# Patient Record
Sex: MALE | Race: BLACK OR AFRICAN AMERICAN | Employment: OTHER | URBAN - METROPOLITAN AREA
[De-identification: names, ages, dates, MRNs, and addresses within clinical notes are randomized per-mention and may not be internally consistent; named-entity substitution may affect disease eponyms.]

---

## 2017-01-05 ENCOUNTER — PATIENT OUTREACH (OUTPATIENT)
Dept: CASE MANAGEMENT | Age: 82
End: 2017-01-05

## 2017-01-05 ENCOUNTER — HOSPITAL ENCOUNTER (OUTPATIENT)
Dept: INFUSION THERAPY | Age: 82
Discharge: HOME | End: 2017-01-05

## 2017-01-05 ENCOUNTER — HOSPITAL ENCOUNTER (OUTPATIENT)
Dept: LAB | Age: 82
Discharge: HOME | End: 2017-01-05
Payer: MEDICARE

## 2017-01-05 DIAGNOSIS — C61 MALIGNANT NEOPLASM OF PROSTATE (HCC): Chronic | ICD-10-CM

## 2017-01-05 LAB
ALBUMIN SERPL BCP-MCNC: 3.6 G/DL (ref 3.2–4.6)
ALBUMIN/GLOB SERPL: 0.9 {RATIO} (ref 1.2–3.5)
ALP SERPL-CCNC: 81 U/L (ref 50–136)
ALT SERPL-CCNC: 27 U/L (ref 12–65)
ANION GAP BLD CALC-SCNC: 5 MMOL/L (ref 7–16)
AST SERPL W P-5'-P-CCNC: 19 U/L (ref 15–37)
BASOPHILS # BLD AUTO: 0 K/UL (ref 0–0.2)
BASOPHILS # BLD: 0 % (ref 0–2)
BILIRUB SERPL-MCNC: 0.3 MG/DL (ref 0.2–1.1)
BUN SERPL-MCNC: 12 MG/DL (ref 8–23)
CALCIUM SERPL-MCNC: 8.6 MG/DL (ref 8.3–10.4)
CHLORIDE SERPL-SCNC: 106 MMOL/L (ref 98–107)
CO2 SERPL-SCNC: 30 MMOL/L (ref 23–32)
CREAT SERPL-MCNC: 1.08 MG/DL (ref 0.8–1.5)
DIFFERENTIAL METHOD BLD: ABNORMAL
EOSINOPHIL # BLD: 0 K/UL (ref 0–0.8)
EOSINOPHIL NFR BLD: 1 % (ref 0.5–7.8)
ERYTHROCYTE [DISTWIDTH] IN BLOOD BY AUTOMATED COUNT: 17.3 % (ref 11.9–14.6)
GLOBULIN SER CALC-MCNC: 4.2 G/DL (ref 2.3–3.5)
GLUCOSE SERPL-MCNC: 93 MG/DL (ref 65–100)
HCT VFR BLD AUTO: 40.4 % (ref 41.1–50.3)
HGB BLD-MCNC: 13.2 G/DL (ref 13.6–17.2)
LYMPHOCYTES # BLD AUTO: 38 % (ref 13–44)
LYMPHOCYTES # BLD: 1.3 K/UL (ref 0.5–4.6)
MCH RBC QN AUTO: 29.3 PG (ref 26.1–32.9)
MCHC RBC AUTO-ENTMCNC: 32.7 G/DL (ref 31.4–35)
MCV RBC AUTO: 89.6 FL (ref 79.6–97.8)
MONOCYTES # BLD: 0.5 K/UL (ref 0.1–1.3)
MONOCYTES NFR BLD AUTO: 14 % (ref 4–12)
NEUTS SEG # BLD: 1.6 K/UL (ref 1.7–8.2)
NEUTS SEG NFR BLD AUTO: 47 % (ref 43–78)
NRBC # BLD: 0 K/UL (ref 0–0.2)
NRBC BLD-RTO: 0 PER 100 WBC (ref 0–2)
PLATELET # BLD AUTO: 194 K/UL (ref 150–450)
PMV BLD AUTO: 9.9 FL (ref 10.8–14.1)
POTASSIUM SERPL-SCNC: 4 MMOL/L (ref 3.5–5.1)
PROT SERPL-MCNC: 7.8 G/DL (ref 6.3–8.2)
RBC # BLD AUTO: 4.51 M/UL (ref 4.23–5.67)
SODIUM SERPL-SCNC: 141 MMOL/L (ref 136–145)
WBC # BLD AUTO: 3.5 K/UL (ref 4.3–11.1)

## 2017-01-05 PROCEDURE — 80053 COMPREHEN METABOLIC PANEL: CPT | Performed by: INTERNAL MEDICINE

## 2017-01-05 PROCEDURE — 85025 COMPLETE CBC W/AUTO DIFF WBC: CPT | Performed by: INTERNAL MEDICINE

## 2017-01-05 NOTE — PROGRESS NOTES
[facility] follow up call to check on patient after visit with oncologist. Laura Moraes to reach patient. Left message requesting a return call. This note will not be viewable in [address]

## 2017-01-06 ENCOUNTER — PATIENT OUTREACH (OUTPATIENT)
Dept: CASE MANAGEMENT | Age: 82
End: 2017-01-06

## 2017-01-06 NOTE — PROGRESS NOTES
CCM follow up call. Spoke with patient's wife regarding oncology visit. They have stopped his treatments at this time CT scan on 3/29/17. Wife reports she and patient decided to stop treatments until CT completed due to his dementia and weight loss. She stated that they return to [facility] in a month but is having issues with paperwork they requested. [facility] suggested a HV to assist with paperwork. She requested a return call on 1/13/16 after her MD appointment to schedule the HV. This note will not be viewable in [address]

## 2017-01-25 ENCOUNTER — PATIENT OUTREACH (OUTPATIENT)
Dept: CASE MANAGEMENT | Age: 82
End: 2017-01-25

## 2017-01-25 NOTE — PROGRESS NOTES
CCM follow up on 1/13/17 and today to try to set up a home visit. Unable to reach patient on both attempts. Left message requesting a return call. CCM will continue to make attempt to outreach to patient. This note will not be viewable in [address]

## 2017-02-01 ENCOUNTER — PATIENT OUTREACH (OUTPATIENT)
Dept: CASE MANAGEMENT | Age: 82
End: 2017-02-01

## 2017-02-01 NOTE — PROGRESS NOTES
CCM follow up call. Spoke with patient's wife to discuss scheduling a HV to assist with paperwork for [facility] program. Wife states today is not a good time, she will need time to find her paperwork. Requested CCM call 2/2/17. CCM will return call as requested. This note will not be viewable in [address]

## 2017-02-02 ENCOUNTER — PATIENT OUTREACH (OUTPATIENT)
Dept: CASE MANAGEMENT | Age: 82
End: 2017-02-02

## 2017-02-02 NOTE — PROGRESS NOTES
CCM follow up call. Spoke with patient's wife. Home visit scheduled for 2/7/17 to assist with getting all appointments up to date and completing paperwork for appointments. This note will not be viewable in [address]

## 2017-02-07 ENCOUNTER — PATIENT OUTREACH (OUTPATIENT)
Dept: CASE MANAGEMENT | Age: 82
End: 2017-02-07

## 2017-02-07 NOTE — PROGRESS NOTES
Home visit with patient and wife to assist with paperwork for [facility]. Patient's wife unable to locate paperwork and date of appointment. Call placed to [facility] to request paperwork and date of scheduled appointment. CCM left message with contact number requesting a return call. CCM to follow up with patient with information once verified with [facility]. This note will not be viewable in [address]

## 2017-02-09 ENCOUNTER — PATIENT OUTREACH (OUTPATIENT)
Dept: CASE MANAGEMENT | Age: 82
End: 2017-02-09

## 2017-02-09 NOTE — PROGRESS NOTES
[facility] follow up call. Patient's wife informed of another call being placed to [facility]. A 2nd message was left on nursing line requesting a return call. [facility] also placed call to [facility] requesting information for [place] assistance. Message left with contact information requesting a return call. This note will not be viewable in [address]

## 2017-02-13 ENCOUNTER — PATIENT OUTREACH (OUTPATIENT)
Dept: CASE MANAGEMENT | Age: 82
End: 2017-02-13

## 2017-02-13 NOTE — PROGRESS NOTES
CCM follow up call to inform patient of [facility] program upcoming appointment on 3/1/17 at 3:00pm. Spoke with patient's wife. Wife stated they will attend. Wife requested a referral for herself. [facility] notified Dr. Araceli Chao of wife's request. Informed patient's wife that another message was left at [facility] office requesting a return call to patient or CCM. This note will not be viewable in [address]

## 2017-02-20 ENCOUNTER — PATIENT OUTREACH (OUTPATIENT)
Dept: CASE MANAGEMENT | Age: 82
End: 2017-02-20

## 2017-02-20 NOTE — PROGRESS NOTES
CCM follow up call. Unable to reach patient or leave message, there was no answer. This note will not be viewable in [address]

## 2017-02-23 ENCOUNTER — PATIENT OUTREACH (OUTPATIENT)
Dept: CASE MANAGEMENT | Age: 82
End: 2017-02-23

## 2017-02-23 NOTE — PROGRESS NOTES
CCM follow up call. Informed patient's wife of receiving [employer]. CCM to forward paperwork. Patient's wife to call CCM is assistance is needed with completing paperwork. This note will not be viewable in [address]

## 2017-03-01 ENCOUNTER — PATIENT OUTREACH (OUTPATIENT)
Dept: CASE MANAGEMENT | Age: 82
End: 2017-03-01

## 2017-03-01 NOTE — PROGRESS NOTES
CCM follow up call. Spoke with patient's wife.  reports they will attend [facility] appointment today at 3:00pm. Verified receipt of paperwork from [employer], but hasn't had the time to look over. Patient's wife to notify CCM if she will require assistance with completing paperwork. This note will not be viewable in [address]

## 2017-03-08 ENCOUNTER — PATIENT OUTREACH (OUTPATIENT)
Dept: CASE MANAGEMENT | Age: 82
End: 2017-03-08

## 2017-03-08 NOTE — PROGRESS NOTES
CCM follow up call. Unable to reach patient or leave message. No answer. This note will not be viewable in [address]

## 2017-03-15 ENCOUNTER — PATIENT OUTREACH (OUTPATIENT)
Dept: CASE MANAGEMENT | Age: 82
End: 2017-03-15

## 2017-03-15 NOTE — PROGRESS NOTES
CCM follow up call. Spoke with patient's wife. Patient has several appointment this month. Labs 3/16/17 for PCP appointment on 3/24/17. CT scan 3/29/17 for oncology appointment 4/5/17 to decide course of treatment. Wife states she will transport to all appointments. Patient's wife verified receipt of [employer] paperwork requesting benefits but has yet to complete. She agreed to notify [facility] if assistance is needed. This note will not be viewable in [address]

## 2017-03-22 ENCOUNTER — PATIENT OUTREACH (OUTPATIENT)
Dept: CASE MANAGEMENT | Age: 82
End: 2017-03-22

## 2017-03-22 NOTE — PROGRESS NOTES
CCM follow up call. Unable to reach patient or patient's wife. Left message with contact information requesting a return call. This note will not be viewable in [address]

## 2017-03-29 ENCOUNTER — PATIENT OUTREACH (OUTPATIENT)
Dept: CASE MANAGEMENT | Age: 82
End: 2017-03-29

## 2017-03-29 NOTE — PROGRESS NOTES
CCM follow up call. Patient requested a home visit to assist with 's affairs paperwork. It was observed in patient;s note that wife stated he isn't taking medications as ordered since his daughter became ill. CCM to provide pill minder and assist patient's wife with medication preparation. Home visit scheduled 3/30/17. This note will not be viewable in [address]

## 2017-03-30 ENCOUNTER — PATIENT OUTREACH (OUTPATIENT)
Dept: CASE MANAGEMENT | Age: 82
End: 2017-03-30

## 2017-04-06 ENCOUNTER — PATIENT OUTREACH (OUTPATIENT)
Dept: CASE MANAGEMENT | Age: 82
End: 2017-04-06

## 2017-04-06 NOTE — PROGRESS NOTES
CCM home visit to assist patient's wife with arranging medications in a pill minder to ensure patient receives medications as ordered. Ferrous Sulfate and Levothyroxine not available at time of visit. Patient's wife to notify PCP and pharmacy regarding refills. Medication reconciliation completed and education provided to patieint's wife. CCM stressed the importance of taking medications as ordered. Patient's wife verbalized understanding. Wife to notified CCM if additional assistance is needed. This note will not be viewable in [address]

## 2017-04-11 ENCOUNTER — HOSPITAL ENCOUNTER (OUTPATIENT)
Dept: CT IMAGING | Age: 82
Discharge: HOME | End: 2017-04-11
Attending: INTERNAL MEDICINE
Payer: MEDICARE

## 2017-04-11 DIAGNOSIS — C18.4 MALIGNANT NEOPLASM OF TRANSVERSE COLON (HCC): Chronic | ICD-10-CM

## 2017-04-11 DIAGNOSIS — C61 MALIGNANT NEOPLASM OF PROSTATE (HCC): Chronic | ICD-10-CM

## 2017-04-11 PROCEDURE — 74011000258 HC RX REV CODE- 258: Performed by: INTERNAL MEDICINE

## 2017-04-11 PROCEDURE — 74011250636 HC RX REV CODE- 250/636: Performed by: INTERNAL MEDICINE

## 2017-04-11 PROCEDURE — 74011636320 HC RX REV CODE- 636/320: Performed by: INTERNAL MEDICINE

## 2017-04-11 PROCEDURE — 74177 CT ABD & PELVIS W/CONTRAST: CPT

## 2017-04-11 RX ORDER — SODIUM CHLORIDE 0.9 % (FLUSH) 0.9 %
10 SYRINGE (ML) INJECTION
Status: COMPLETED | OUTPATIENT
Start: 2017-04-11 | End: 2017-04-11

## 2017-04-11 RX ORDER — HEPARIN 100 UNIT/ML
500 SYRINGE INTRAVENOUS ONCE
Status: COMPLETED | OUTPATIENT
Start: 2017-04-11 | End: 2017-04-11

## 2017-04-11 RX ADMIN — IOPAMIDOL 100 ML: 755 INJECTION, SOLUTION INTRAVENOUS at 15:26

## 2017-04-11 RX ADMIN — Medication 10 ML: at 15:26

## 2017-04-11 RX ADMIN — SODIUM CHLORIDE, PRESERVATIVE FREE 500 UNITS: 5 INJECTION INTRAVENOUS at 15:41

## 2017-04-11 RX ADMIN — SODIUM CHLORIDE 100 ML: 900 INJECTION, SOLUTION INTRAVENOUS at 15:26

## 2017-04-11 RX ADMIN — DIATRIZOATE MEGLUMINE AND DIATRIZOATE SODIUM 30 ML: 660; 100 LIQUID ORAL; RECTAL at 15:26

## 2017-04-13 ENCOUNTER — DOCUMENTATION ONLY (OUTPATIENT)
Dept: ONCOLOGY | Age: 82
End: 2017-04-13

## 2017-04-13 ENCOUNTER — PATIENT OUTREACH (OUTPATIENT)
Dept: CASE MANAGEMENT | Age: 82
End: 2017-04-13

## 2017-04-13 ENCOUNTER — HOSPITAL ENCOUNTER (OUTPATIENT)
Dept: LAB | Age: 82
Discharge: HOME | End: 2017-04-13
Payer: MEDICARE

## 2017-04-13 DIAGNOSIS — C18.4 MALIGNANT NEOPLASM OF TRANSVERSE COLON (HCC): Chronic | ICD-10-CM

## 2017-04-13 LAB — CEA SERPL-MCNC: 1.5 NG/ML (ref 0–3)

## 2017-04-13 PROCEDURE — 36415 COLL VENOUS BLD VENIPUNCTURE: CPT | Performed by: INTERNAL MEDICINE

## 2017-04-13 PROCEDURE — 82378 CARCINOEMBRYONIC ANTIGEN: CPT | Performed by: INTERNAL MEDICINE

## 2017-04-13 NOTE — PROGRESS NOTES
SW followed up with pt and his wife. Pt's wife stated her daughter passed away two weeks ago and she verbalized having a difficult time. SW provided psychosocial support and provided contact information for [facility] grief support group and [facility] grief support group. Pt's wife verbalized appreciation and understanding. SW encouraged her to engage in self-care and to reach out to support group and she verbalized understanding. No other needs identified. SW intends to follow up PRN.

## 2017-04-14 ENCOUNTER — HOSPITAL ENCOUNTER (OUTPATIENT)
Dept: ULTRASOUND IMAGING | Age: 82
Discharge: HOME | End: 2017-04-14
Attending: INTERNAL MEDICINE
Payer: MEDICARE

## 2017-04-14 DIAGNOSIS — I81 PORTAL VEIN THROMBOSIS: ICD-10-CM

## 2017-04-14 PROCEDURE — 76705 ECHO EXAM OF ABDOMEN: CPT

## 2017-04-20 ENCOUNTER — PATIENT OUTREACH (OUTPATIENT)
Dept: CASE MANAGEMENT | Age: 82
End: 2017-04-20

## 2017-04-25 ENCOUNTER — HOSPITAL ENCOUNTER (OUTPATIENT)
Dept: LAB | Age: 82
Discharge: HOME | End: 2017-04-25
Payer: MEDICARE

## 2017-04-25 DIAGNOSIS — C18.4 MALIGNANT NEOPLASM OF TRANSVERSE COLON (HCC): Chronic | ICD-10-CM

## 2017-04-25 DIAGNOSIS — C61 MALIGNANT NEOPLASM OF PROSTATE (HCC): Chronic | ICD-10-CM

## 2017-04-25 LAB
ALBUMIN SERPL BCP-MCNC: 3.7 G/DL (ref 3.2–4.6)
ALBUMIN/GLOB SERPL: 0.9 {RATIO} (ref 1.2–3.5)
ALP SERPL-CCNC: 89 U/L (ref 50–136)
ALT SERPL-CCNC: 30 U/L (ref 12–65)
ANION GAP BLD CALC-SCNC: 7 MMOL/L (ref 7–16)
AST SERPL W P-5'-P-CCNC: 21 U/L (ref 15–37)
BASOPHILS # BLD AUTO: 0 K/UL (ref 0–0.2)
BASOPHILS # BLD: 0 % (ref 0–2)
BILIRUB SERPL-MCNC: 0.4 MG/DL (ref 0.2–1.1)
BUN SERPL-MCNC: 13 MG/DL (ref 8–23)
CALCIUM SERPL-MCNC: 9.1 MG/DL (ref 8.3–10.4)
CHLORIDE SERPL-SCNC: 105 MMOL/L (ref 98–107)
CO2 SERPL-SCNC: 29 MMOL/L (ref 21–32)
CREAT SERPL-MCNC: 1.23 MG/DL (ref 0.8–1.5)
DIFFERENTIAL METHOD BLD: ABNORMAL
EOSINOPHIL # BLD: 0 K/UL (ref 0–0.8)
EOSINOPHIL NFR BLD: 1 % (ref 0.5–7.8)
ERYTHROCYTE [DISTWIDTH] IN BLOOD BY AUTOMATED COUNT: 13.9 % (ref 11.9–14.6)
GLOBULIN SER CALC-MCNC: 4 G/DL (ref 2.3–3.5)
GLUCOSE SERPL-MCNC: 105 MG/DL (ref 65–100)
HCT VFR BLD AUTO: 43.8 % (ref 41.1–50.3)
HGB BLD-MCNC: 14.6 G/DL (ref 13.6–17.2)
LYMPHOCYTES # BLD AUTO: 29 % (ref 13–44)
LYMPHOCYTES # BLD: 1.2 K/UL (ref 0.5–4.6)
MAGNESIUM SERPL-MCNC: 2.5 MG/DL (ref 1.8–2.4)
MCH RBC QN AUTO: 29.7 PG (ref 26.1–32.9)
MCHC RBC AUTO-ENTMCNC: 33.3 G/DL (ref 31.4–35)
MCV RBC AUTO: 89.2 FL (ref 79.6–97.8)
MONOCYTES # BLD: 0.5 K/UL (ref 0.1–1.3)
MONOCYTES NFR BLD AUTO: 11 % (ref 4–12)
NEUTS SEG # BLD: 2.5 K/UL (ref 1.7–8.2)
NEUTS SEG NFR BLD AUTO: 59 % (ref 43–78)
NRBC # BLD: 0 K/UL (ref 0–0.2)
PLATELET # BLD AUTO: 205 K/UL (ref 150–450)
PMV BLD AUTO: 9.9 FL (ref 10.8–14.1)
POTASSIUM SERPL-SCNC: 4.1 MMOL/L (ref 3.5–5.1)
PROT SERPL-MCNC: 7.7 G/DL (ref 6.3–8.2)
PSA SERPL-MCNC: 0.2 NG/ML
RBC # BLD AUTO: 4.91 M/UL (ref 4.23–5.67)
SODIUM SERPL-SCNC: 141 MMOL/L (ref 136–145)
WBC # BLD AUTO: 4.3 K/UL (ref 4.3–11.1)

## 2017-04-25 PROCEDURE — 80053 COMPREHEN METABOLIC PANEL: CPT | Performed by: INTERNAL MEDICINE

## 2017-04-25 PROCEDURE — 84153 ASSAY OF PSA TOTAL: CPT | Performed by: INTERNAL MEDICINE

## 2017-04-25 PROCEDURE — 85025 COMPLETE CBC W/AUTO DIFF WBC: CPT | Performed by: INTERNAL MEDICINE

## 2017-04-25 PROCEDURE — 83735 ASSAY OF MAGNESIUM: CPT | Performed by: INTERNAL MEDICINE

## 2017-05-04 ENCOUNTER — PATIENT OUTREACH (OUTPATIENT)
Dept: CASE MANAGEMENT | Age: 82
End: 2017-05-04

## 2017-05-04 NOTE — PROGRESS NOTES
CCM follow up call. Patient's wife reports patient is scheduled for colonoscopy on 5/15/17. She is concerned about getting him to complete the prep. Patient's wife to read over the prep orders and notify CCM with any questions regarding the prep. This note will not be viewable in [address]

## 2017-05-11 ENCOUNTER — PATIENT OUTREACH (OUTPATIENT)
Dept: CASE MANAGEMENT | Age: 82
End: 2017-05-11

## 2017-05-11 NOTE — PROGRESS NOTES
CCM follow up call. Patient's wife requested home visit to discuss patient's orders prior to colonoscopy scheduled for 5/15/17. CCM to visit 5/12/17 to assist as requested. This note will not be viewable in [address]

## 2017-05-12 ENCOUNTER — PATIENT OUTREACH (OUTPATIENT)
Dept: CASE MANAGEMENT | Age: 82
End: 2017-05-12

## 2017-05-12 NOTE — PROGRESS NOTES
CCM home visit to assist patient's wife with pre-op instructions for patient's colonoscopy on 5/15/17. Patient's wife verbalized understanding pro-op orders and the time to begin. Patient's wife agreed to notify CCM with any issue/concerns prior to initiation of orders. This note will not be viewable in [address]

## 2017-05-15 ENCOUNTER — HOSPITAL ENCOUNTER (OUTPATIENT)
Age: 82
Setting detail: OUTPATIENT SURGERY
Discharge: HOME | End: 2017-05-15
Attending: SURGERY | Admitting: SURGERY
Payer: MEDICARE

## 2017-05-15 VITALS
HEIGHT: 70 IN | RESPIRATION RATE: 10 BRPM | HEART RATE: 46 BPM | DIASTOLIC BLOOD PRESSURE: 81 MMHG | TEMPERATURE: 98.4 F | WEIGHT: 150 LBS | BODY MASS INDEX: 21.47 KG/M2 | OXYGEN SATURATION: 98 % | SYSTOLIC BLOOD PRESSURE: 173 MMHG

## 2017-05-15 PROCEDURE — 74011250636 HC RX REV CODE- 250/636: Performed by: SURGERY

## 2017-05-15 PROCEDURE — 99153 MOD SED SAME PHYS/QHP EA: CPT | Performed by: SURGERY

## 2017-05-15 PROCEDURE — 77030003560 HC NDL HUBR BARD -A: Performed by: SURGERY

## 2017-05-15 PROCEDURE — 76040000019: Performed by: SURGERY

## 2017-05-15 PROCEDURE — 74011250636 HC RX REV CODE- 250/636

## 2017-05-15 PROCEDURE — 99152 MOD SED SAME PHYS/QHP 5/>YRS: CPT | Performed by: SURGERY

## 2017-05-15 RX ORDER — MIDAZOLAM HYDROCHLORIDE 1 MG/ML
.25-5 INJECTION, SOLUTION INTRAMUSCULAR; INTRAVENOUS
Status: DISCONTINUED | OUTPATIENT
Start: 2017-05-15 | End: 2017-05-15 | Stop reason: HOSPADM

## 2017-05-15 RX ORDER — SODIUM CHLORIDE 9 MG/ML
75 INJECTION, SOLUTION INTRAVENOUS CONTINUOUS
Status: DISCONTINUED | OUTPATIENT
Start: 2017-05-15 | End: 2017-05-15 | Stop reason: HOSPADM

## 2017-05-15 RX ORDER — HEPARIN 100 UNIT/ML
300 SYRINGE INTRAVENOUS AS NEEDED
Status: DISCONTINUED | OUTPATIENT
Start: 2017-05-15 | End: 2017-05-15 | Stop reason: HOSPADM

## 2017-05-15 RX ORDER — ONDANSETRON 2 MG/ML
4-8 INJECTION INTRAMUSCULAR; INTRAVENOUS
Status: DISCONTINUED | OUTPATIENT
Start: 2017-05-15 | End: 2017-05-15 | Stop reason: HOSPADM

## 2017-05-15 RX ORDER — FENTANYL CITRATE 50 UG/ML
50 INJECTION, SOLUTION INTRAMUSCULAR; INTRAVENOUS
Status: DISCONTINUED | OUTPATIENT
Start: 2017-05-15 | End: 2017-05-15 | Stop reason: HOSPADM

## 2017-05-15 RX ORDER — DIPHENHYDRAMINE HYDROCHLORIDE 50 MG/ML
50 INJECTION, SOLUTION INTRAMUSCULAR; INTRAVENOUS ONCE
Status: DISCONTINUED | OUTPATIENT
Start: 2017-05-15 | End: 2017-05-15 | Stop reason: HOSPADM

## 2017-05-15 RX ADMIN — Medication 300 UNITS: at 11:00

## 2017-05-15 NOTE — DISCHARGE INSTRUCTIONS
Gastrointestinal Colonoscopy/Flexible Sigmoidoscopy - Lower Exam Discharge Instructions  1. Call Dr. Crystal Franco for any problems or questions. 2. Contact the doctors office for follow up appointment as directed  3. Medication may cause drowsiness for several hours, therefore, do not drive or operate machinery for remainder of the day. 4. No alcohol today. 5. Ordinarily, you may resume regular diet and activity after exam unless otherwise specified by your physician. 6. Because of air put into your colon during exam, you may experience some abdominal distension, relieved by the passage of gas, for several hours. 7. Contact your physician if you have any of the following:  a. Excessive amount of bleeding - large amount when having a bowel movement. Occasional specks of blood with bowel movement would not be unusual.  b. Severe abdominal pain  c. Fever or Chills  Any additional instructions: Follow up in 3 years    Instructions given to Roderick. and other family members.   Instructions given by:  Tori Barragan

## 2017-05-15 NOTE — IP AVS SNAPSHOT
[address] 
[phone] Patient: Jaya Martinez. MRN: [id number] QYS:[id number] You are allergic to the following Allergen Reactions Shellfish Containing Products Rash Recent Documentation Height Weight BMI Smoking Status 1.778 m 68 kg 21.52 kg/m2 Never Smoker Emergency Contacts Name Discharge Info Relation Home Work Mobile Kamilla Dave DISCHARGE CAREGIVER [3] Spouse [3] [phone] About your hospitalization You were admitted on:  May 15, 2017 You last received care in the:  [facility] You were discharged on:  May 15, 2017 Unit phone number:  [phone] Why you were hospitalized Your primary diagnosis was:  Not on File Providers Seen During Your Hospitalizations Provider Role Specialty Primary office phone Adriana Vieyra MD Attending Provider General Surgery [phone] Your Primary Care Physician (PCP) Primary Care Physician Office Phone Office Fax [address] [phone] Follow-up Information None Your Appointments Thursday June 29, 2017  8:10 AM EDT  
LAB with IMD LAB [facility] ([address] 2 [facility] [address] [place] [id number] [phone] Current Discharge Medication List  
  
ASK your doctor about these medications Dose & Instructions Dispensing Information Comments Morning Noon Evening Bedtime  
 acetaminophen 325 mg tablet Commonly known as:  TYLENOL Your last dose was: Your next dose is: TAKE 2 TABLETS BY MOUTH EVERY 8 HOURS AS NEEDED Refills:  0  
     
   
   
   
  
 docusate sodium 100 mg capsule Commonly known as:  Cheryl Pleitez Your last dose was: Your next dose is:  TID prn  
 Refills:  0  
     
   
   
   
  
 ferrous sulfate 325 mg (65 mg iron) tablet Your last dose was: Your next dose is:    
   
   
 Bid or tid as directed Quantity:  180 Tab Refills:  3  
     
   
   
   
  
 levothyroxine 50 mcg tablet Commonly known as:  SYNTHROID Your last dose was: Your next dose is:    
   
   
 Dose:  50 mcg Take 1 Tab by mouth Daily (before breakfast). Quantity:  90 Tab Refills:  3  
     
   
   
   
  
 melatonin 3 mg tablet Your last dose was: Your next dose is:    
   
   
 as needed Refills:  0  
     
   
   
   
  
 memantine-donepezil 28-10 mg Cspx Commonly known as:  MOTION PICTURE [facility] Your last dose was: Your next dose is:    
   
   
 Dose:  1 Cap Take 1 Cap by mouth daily. Quantity:  90 Cap Refills:  3 VITAMIN D2 50,000 unit capsule Generic drug:  ergocalciferol Your last dose was: Your next dose is:    
   
   
 Dose:  225[address] Units Take 50,000 Units by mouth every seven (7) days. Refills:  0 Discharge Instructions Gastrointestinal Colonoscopy/Flexible Sigmoidoscopy - Lower Exam Discharge Instructions 1. Call Dr. Pina Nix for any problems or questions. 2. Contact the doctors office for follow up appointment as directed 3. Medication may cause drowsiness for several hours, therefore, do not drive or operate machinery for remainder of the day. 4. No alcohol today. 5. Ordinarily, you may resume regular diet and activity after exam unless otherwise specified by your physician. 6. Because of air put into your colon during exam, you may experience some abdominal distension, relieved by the passage of gas, for several hours. 7. Contact your physician if you have any of the following: 
a. Excessive amount of bleeding  large amount when having a bowel movement.   Occasional specks of blood with bowel movement would not be unusual. 
b. Severe abdominal pain 
c. Fever or Chills Any additional instructions: Follow up in 3 years Instructions given to Roderick. and other family members. Instructions given by:  Kieran Medel Discharge Orders None ACO Transitions of Care Introducing [facility] [address] offers a voluntary care coordination program to provide high quality service and care to [facility] fee-for-service beneficiaries. Beto Noel was designed to help you enhance your health and well-being through the following services: ? Transitions of Care  support for individuals who are transitioning from one care setting to another (example: Hospital to home). ? Chronic and Complex Care Coordination  support for individuals and caregivers of those with serious or chronic illnesses or with more than one chronic (ongoing) condition and those who take a number of different medications. If you meet specific medical criteria, a [address] [place] [address] Rd may call you directly to coordinate your care with your primary care physician and your other care providers. For questions about the [facility] programs, please, contact your physicians office. For general questions or additional information about Accountable Care Organizations: 
Please visit www.medicare.gov/acos. html or call 1-800-MEDICARE (0-130.101.5027) TTY users should call 6-471.346.8247. Introducing [facility]! Jason Bashir introduces [employer] patient portal. Now you can access parts of your medical record, email your doctor's office, and request medication refills online. 1. In your internet browser, go to [email or web address] 2. Click on the First Time User? Click Here link in the Sign In box. You will see the New Member Sign Up page. 3. Enter your [employer] Access Code exactly as it appears below.  You will not need to use this code after youve completed the sign-up process. If you do not sign up before the expiration date, you must request a new code. · [employer] Access Code: [email or web address] Expires: 7/6/2017 10:47 AM 
 
4. Enter the last four digits of your Social Security Number (xxxx) and Date of Birth (mm/dd/yyyy) as indicated and click Submit. You will be taken to the next sign-up page. 5. Create a [employer] ID. This will be your [employer] login ID and cannot be changed, so think of one that is secure and easy to remember. 6. Create a [employer] password. You can change your password at any time. 7. Enter your Password Reset Question and Answer. This can be used at a later time if you forget your password. 8. Enter your e-mail address. You will receive e-mail notification when new information is available in [address] 9. Click Sign Up. You can now view and download portions of your medical record. 10. Click the Download Summary menu link to download a portable copy of your medical information. If you have questions, please visit the Frequently Asked Questions section of the [employer] website. Remember, [employer] is NOT to be used for urgent needs. For medical emergencies, dial 911. Now available from your iPhone and Android! General Information Please provide this summary of care documentation to your next provider. Patient Signature:  ____________________________________________________________ Date:  ____________________________________________________________  
  
Candice Bustos Provider Signature:  ____________________________________________________________ Date:  ____________________________________________________________

## 2017-05-15 NOTE — PROGRESS NOTES
Port accessed using sterile technique. Good blood return and flushes well. Jo needle inserted without difficulty.  Pt tolerated well

## 2017-05-16 ENCOUNTER — PATIENT OUTREACH (OUTPATIENT)
Dept: CASE MANAGEMENT | Age: 82
End: 2017-05-16

## 2017-05-16 NOTE — PROGRESS NOTES
CCM follow up call. Patient's wife stated the colonoscopy went well on 5/15/17. No issues or concerns at present time. Wife to notify CCM with any future issues/concerns. This note will not be viewable in [address]

## 2017-05-30 ENCOUNTER — PATIENT OUTREACH (OUTPATIENT)
Dept: CASE MANAGEMENT | Age: 82
End: 2017-05-30

## 2017-05-30 NOTE — PROGRESS NOTES
CCM follow up call. Unable to talk with patient or patient's wife. Wife reports \"we are on our way out the door for an appointment. \" Patient's wife to call CCM when they return. This note will not be viewable in [address]

## 2017-06-23 ENCOUNTER — PATIENT OUTREACH (OUTPATIENT)
Dept: CASE MANAGEMENT | Age: 82
End: 2017-06-23

## 2017-06-23 NOTE — PROGRESS NOTES
CCM follow up call. Patient's wife reports \"things remain about the same. \" She continues to administer medications as ordered. Reports she may require CCM assistance with [employer] paperwork. She will notify CCM is assistance is needed. This note will not be viewable in [address]

## 2017-06-28 ENCOUNTER — PATIENT OUTREACH (OUTPATIENT)
Dept: CASE MANAGEMENT | Age: 82
End: 2017-06-28

## 2017-06-28 NOTE — PROGRESS NOTES
CCM follow call. Patient's wife states she is unable to talk at present time. They have an appointment. She will return call when she is available to talk. This note will not be viewable in [address]

## 2017-07-05 ENCOUNTER — PATIENT OUTREACH (OUTPATIENT)
Dept: CASE MANAGEMENT | Age: 82
End: 2017-07-05

## 2017-07-19 ENCOUNTER — PATIENT OUTREACH (OUTPATIENT)
Dept: CASE MANAGEMENT | Age: 82
End: 2017-07-19

## 2017-07-21 PROBLEM — R73.01 FASTING HYPERGLYCEMIA: Status: ACTIVE | Noted: 2017-07-21

## 2017-08-02 ENCOUNTER — PATIENT OUTREACH (OUTPATIENT)
Dept: CASE MANAGEMENT | Age: 82
End: 2017-08-02

## 2017-08-02 NOTE — PROGRESS NOTES
[facility] follow up call. Patient's wife reports she obtained patient's Namzaric and Vitamin D. She states she is using pill minder for preparation of patient's medications to administer as ordered. Patient to follow up with Oncologist 10/25/17, PCP 11/3/17, and Neurologist 1/24/17. She has completed the [employer] paperwork and plans to go to the office to apply for  benefits for patient. She states she will contact [facility] with any future issues/concerns. This note will not be viewable in [address]

## 2017-08-13 ENCOUNTER — HOSPITAL ENCOUNTER (EMERGENCY)
Age: 82
Discharge: HOME | End: 2017-08-13
Attending: EMERGENCY MEDICINE
Payer: MEDICARE

## 2017-08-13 VITALS
TEMPERATURE: 98.2 F | WEIGHT: 161 LBS | HEART RATE: 85 BPM | DIASTOLIC BLOOD PRESSURE: 98 MMHG | RESPIRATION RATE: 16 BRPM | SYSTOLIC BLOOD PRESSURE: 150 MMHG | HEIGHT: 70 IN | BODY MASS INDEX: 23.05 KG/M2 | OXYGEN SATURATION: 100 %

## 2017-08-13 DIAGNOSIS — Z00.00 WELL ADULT HEALTH CHECK: Primary | ICD-10-CM

## 2017-08-13 PROCEDURE — 99283 EMERGENCY DEPT VISIT LOW MDM: CPT | Performed by: EMERGENCY MEDICINE

## 2017-08-13 NOTE — ED TRIAGE NOTES
Pt family states he is here because it was \"wet\" around his port. Family states it has port has been wet for an undetermined amount of time. No redness or swelling noted. Pt states no pain to the site.

## 2017-08-14 ENCOUNTER — PATIENT OUTREACH (OUTPATIENT)
Dept: CASE MANAGEMENT | Age: 82
End: 2017-08-14

## 2017-08-14 NOTE — ED NOTES
Dressing report from port site. Soiled and states it has been on there for \"about a month. \" Pt is very poor historian. No signs of drainage or leaking.

## 2017-08-14 NOTE — ED NOTES
I have reviewed discharge instructions with the spouse. The spouse verbalized understanding. Discharge medications reviewed with spouse and appropriate educational materials and side effects teaching were provided.

## 2017-08-14 NOTE — PROGRESS NOTES
This note will not be viewable in [address] LINDA OUTREACH #1  Initial outreach attempt unsuccessful. Unable to leave message. No answer. Will attempt 2nd outreach within 24 hrs.

## 2017-08-15 ENCOUNTER — PATIENT OUTREACH (OUTPATIENT)
Dept: CASE MANAGEMENT | Age: 82
End: 2017-08-15

## 2017-08-15 NOTE — PROGRESS NOTES
This note will not be viewable in [address] LINDA OUTREACH #2  Left message to call back. 2nd outreach attempt unsuccessful. Will schedule 3rd outreach attempt within 5 business days.

## 2017-08-21 ENCOUNTER — PATIENT OUTREACH (OUTPATIENT)
Dept: CASE MANAGEMENT | Age: 82
End: 2017-08-21

## 2017-08-21 NOTE — PROGRESS NOTES
This note will not be viewable in [address] LINDA OUTREACH #3  Final outreach call unsuccessful. Unable to reach patient after several attempts. Will close case at this time. Will reopen case if return call is received.

## 2017-10-24 ENCOUNTER — HOSPITAL ENCOUNTER (OUTPATIENT)
Dept: LAB | Age: 82
Discharge: HOME | End: 2017-10-24
Payer: MEDICARE

## 2017-10-24 DIAGNOSIS — C18.4 MALIGNANT NEOPLASM OF TRANSVERSE COLON (HCC): Chronic | ICD-10-CM

## 2017-10-24 DIAGNOSIS — C61 MALIGNANT NEOPLASM OF PROSTATE (HCC): Chronic | ICD-10-CM

## 2017-10-24 LAB
ALBUMIN SERPL-MCNC: 3.6 G/DL (ref 3.2–4.6)
ALBUMIN/GLOB SERPL: 0.9 {RATIO} (ref 1.2–3.5)
ALP SERPL-CCNC: 79 U/L (ref 50–136)
ALT SERPL-CCNC: 16 U/L (ref 12–65)
ANION GAP SERPL CALC-SCNC: 6 MMOL/L (ref 7–16)
AST SERPL-CCNC: 17 U/L (ref 15–37)
BASOPHILS # BLD: 0 K/UL (ref 0–0.2)
BASOPHILS NFR BLD: 0 % (ref 0–2)
BILIRUB SERPL-MCNC: 0.5 MG/DL (ref 0.2–1.1)
BUN SERPL-MCNC: 11 MG/DL (ref 8–23)
CALCIUM SERPL-MCNC: 8.4 MG/DL (ref 8.3–10.4)
CEA SERPL-MCNC: 1.6 NG/ML (ref 0–3)
CHLORIDE SERPL-SCNC: 105 MMOL/L (ref 98–107)
CO2 SERPL-SCNC: 27 MMOL/L (ref 21–32)
CREAT SERPL-MCNC: 1.12 MG/DL (ref 0.8–1.5)
DIFFERENTIAL METHOD BLD: ABNORMAL
EOSINOPHIL # BLD: 0.1 K/UL (ref 0–0.8)
EOSINOPHIL NFR BLD: 2 % (ref 0.5–7.8)
ERYTHROCYTE [DISTWIDTH] IN BLOOD BY AUTOMATED COUNT: 14.4 % (ref 11.9–14.6)
GLOBULIN SER CALC-MCNC: 4.1 G/DL (ref 2.3–3.5)
GLUCOSE SERPL-MCNC: 93 MG/DL (ref 65–100)
HCT VFR BLD AUTO: 40.3 % (ref 41.1–50.3)
HGB BLD-MCNC: 13.5 G/DL (ref 13.6–17.2)
LYMPHOCYTES # BLD: 1.3 K/UL (ref 0.5–4.6)
LYMPHOCYTES NFR BLD: 40 % (ref 13–44)
MCH RBC QN AUTO: 29.2 PG (ref 26.1–32.9)
MCHC RBC AUTO-ENTMCNC: 33.5 G/DL (ref 31.4–35)
MCV RBC AUTO: 87 FL (ref 79.6–97.8)
MONOCYTES # BLD: 0.4 K/UL (ref 0.1–1.3)
MONOCYTES NFR BLD: 12 % (ref 4–12)
NEUTS SEG # BLD: 1.5 K/UL (ref 1.7–8.2)
NEUTS SEG NFR BLD: 46 % (ref 43–78)
NRBC # BLD: 0 K/UL (ref 0–0.2)
PLATELET # BLD AUTO: 200 K/UL (ref 150–450)
PMV BLD AUTO: 10 FL (ref 10.8–14.1)
POTASSIUM SERPL-SCNC: 3.8 MMOL/L (ref 3.5–5.1)
PROT SERPL-MCNC: 7.7 G/DL (ref 6.3–8.2)
RBC # BLD AUTO: 4.63 M/UL (ref 4.23–5.67)
SODIUM SERPL-SCNC: 138 MMOL/L (ref 136–145)
WBC # BLD AUTO: 3.1 K/UL (ref 4.3–11.1)

## 2017-10-24 PROCEDURE — 80053 COMPREHEN METABOLIC PANEL: CPT | Performed by: INTERNAL MEDICINE

## 2017-10-24 PROCEDURE — 85025 COMPLETE CBC W/AUTO DIFF WBC: CPT | Performed by: INTERNAL MEDICINE

## 2017-10-24 PROCEDURE — 82378 CARCINOEMBRYONIC ANTIGEN: CPT | Performed by: INTERNAL MEDICINE

## 2018-03-06 PROBLEM — F02.80 LATE ONSET ALZHEIMER'S DISEASE WITHOUT BEHAVIORAL DISTURBANCE (HCC): Chronic | Status: ACTIVE | Noted: 2018-03-06

## 2018-03-06 PROBLEM — G30.1 LATE ONSET ALZHEIMER'S DISEASE WITHOUT BEHAVIORAL DISTURBANCE (HCC): Chronic | Status: ACTIVE | Noted: 2018-03-06

## 2018-04-24 ENCOUNTER — HOSPITAL ENCOUNTER (OUTPATIENT)
Dept: LAB | Age: 83
Discharge: HOME | End: 2018-04-24
Payer: MEDICARE

## 2018-04-24 DIAGNOSIS — C18.4 MALIGNANT NEOPLASM OF TRANSVERSE COLON (HCC): Chronic | ICD-10-CM

## 2018-04-24 LAB
ALBUMIN SERPL-MCNC: 3.8 G/DL (ref 3.2–4.6)
ALBUMIN/GLOB SERPL: 0.9 {RATIO} (ref 1.2–3.5)
ALP SERPL-CCNC: 90 U/L (ref 50–136)
ALT SERPL-CCNC: 40 U/L (ref 12–65)
ANION GAP SERPL CALC-SCNC: 6 MMOL/L (ref 7–16)
AST SERPL-CCNC: 31 U/L (ref 15–37)
BASOPHILS # BLD: 0 K/UL (ref 0–0.2)
BASOPHILS NFR BLD: 0 % (ref 0–2)
BILIRUB SERPL-MCNC: 0.3 MG/DL (ref 0.2–1.1)
BUN SERPL-MCNC: 15 MG/DL (ref 8–23)
CALCIUM SERPL-MCNC: 8.6 MG/DL (ref 8.3–10.4)
CEA SERPL-MCNC: 1.9 NG/ML (ref 0–3)
CHLORIDE SERPL-SCNC: 108 MMOL/L (ref 98–107)
CO2 SERPL-SCNC: 31 MMOL/L (ref 21–32)
CREAT SERPL-MCNC: 1.28 MG/DL (ref 0.8–1.5)
DIFFERENTIAL METHOD BLD: ABNORMAL
EOSINOPHIL # BLD: 0.1 K/UL (ref 0–0.8)
EOSINOPHIL NFR BLD: 1 % (ref 0.5–7.8)
ERYTHROCYTE [DISTWIDTH] IN BLOOD BY AUTOMATED COUNT: 15 % (ref 11.9–14.6)
GLOBULIN SER CALC-MCNC: 4.4 G/DL (ref 2.3–3.5)
GLUCOSE SERPL-MCNC: 87 MG/DL (ref 65–100)
HCT VFR BLD AUTO: 44 % (ref 41.1–50.3)
HGB BLD-MCNC: 14.3 G/DL (ref 13.6–17.2)
LYMPHOCYTES # BLD: 1.3 K/UL (ref 0.5–4.6)
LYMPHOCYTES NFR BLD: 29 % (ref 13–44)
MCH RBC QN AUTO: 29.9 PG (ref 26.1–32.9)
MCHC RBC AUTO-ENTMCNC: 32.5 G/DL (ref 31.4–35)
MCV RBC AUTO: 92.1 FL (ref 79.6–97.8)
MONOCYTES # BLD: 0.5 K/UL (ref 0.1–1.3)
MONOCYTES NFR BLD: 11 % (ref 4–12)
NEUTS SEG # BLD: 2.7 K/UL (ref 1.7–8.2)
NEUTS SEG NFR BLD: 59 % (ref 43–78)
NRBC # BLD: 0 K/UL (ref 0–0.2)
PLATELET # BLD AUTO: 192 K/UL (ref 150–450)
PMV BLD AUTO: 10.1 FL (ref 10.8–14.1)
POTASSIUM SERPL-SCNC: 3.7 MMOL/L (ref 3.5–5.1)
PROT SERPL-MCNC: 8.2 G/DL (ref 6.3–8.2)
RBC # BLD AUTO: 4.78 M/UL (ref 4.23–5.67)
SODIUM SERPL-SCNC: 145 MMOL/L (ref 136–145)
WBC # BLD AUTO: 4.6 K/UL (ref 4.3–11.1)

## 2018-04-24 PROCEDURE — 36415 COLL VENOUS BLD VENIPUNCTURE: CPT | Performed by: INTERNAL MEDICINE

## 2018-04-24 PROCEDURE — 80053 COMPREHEN METABOLIC PANEL: CPT | Performed by: INTERNAL MEDICINE

## 2018-04-24 PROCEDURE — 82378 CARCINOEMBRYONIC ANTIGEN: CPT | Performed by: INTERNAL MEDICINE

## 2018-04-24 PROCEDURE — 85025 COMPLETE CBC W/AUTO DIFF WBC: CPT | Performed by: INTERNAL MEDICINE

## 2018-04-27 ENCOUNTER — HOSPITAL ENCOUNTER (OUTPATIENT)
Dept: CT IMAGING | Age: 83
Discharge: HOME | End: 2018-04-27
Attending: INTERNAL MEDICINE
Payer: MEDICARE

## 2018-04-27 DIAGNOSIS — C18.4 MALIGNANT NEOPLASM OF TRANSVERSE COLON (HCC): Chronic | ICD-10-CM

## 2018-04-27 LAB — CREAT BLD-MCNC: 1.1 MG/DL (ref 0.8–1.5)

## 2018-04-27 PROCEDURE — 82565 ASSAY OF CREATININE: CPT

## 2018-04-27 PROCEDURE — 74011636320 HC RX REV CODE- 636/320: Performed by: INTERNAL MEDICINE

## 2018-04-27 PROCEDURE — 74011000258 HC RX REV CODE- 258: Performed by: INTERNAL MEDICINE

## 2018-04-27 PROCEDURE — 74177 CT ABD & PELVIS W/CONTRAST: CPT

## 2018-04-27 RX ORDER — SODIUM CHLORIDE 0.9 % (FLUSH) 0.9 %
10 SYRINGE (ML) INJECTION
Status: COMPLETED | OUTPATIENT
Start: 2018-04-27 | End: 2018-04-27

## 2018-04-27 RX ADMIN — IOPAMIDOL 100 ML: 755 INJECTION, SOLUTION INTRAVENOUS at 14:11

## 2018-04-27 RX ADMIN — DIATRIZOATE MEGLUMINE AND DIATRIZOATE SODIUM 15 ML: 660; 100 LIQUID ORAL; RECTAL at 14:14

## 2018-04-27 RX ADMIN — Medication 10 ML: at 14:14

## 2018-04-27 RX ADMIN — SODIUM CHLORIDE 100 ML: 900 INJECTION, SOLUTION INTRAVENOUS at 14:14

## 2018-08-09 ENCOUNTER — HOSPITAL ENCOUNTER (EMERGENCY)
Age: 83
Discharge: HOME | End: 2018-08-10
Attending: EMERGENCY MEDICINE
Payer: MEDICARE

## 2018-08-09 DIAGNOSIS — Z45.2 ENCOUNTER FOR ADJUSTMENT AND MANAGEMENT OF VASCULAR ACCESS DEVICE: Primary | ICD-10-CM

## 2018-08-09 PROCEDURE — 99284 EMERGENCY DEPT VISIT MOD MDM: CPT | Performed by: EMERGENCY MEDICINE

## 2018-08-10 VITALS
DIASTOLIC BLOOD PRESSURE: 80 MMHG | SYSTOLIC BLOOD PRESSURE: 168 MMHG | TEMPERATURE: 98.1 F | OXYGEN SATURATION: 98 % | HEART RATE: 84 BPM | RESPIRATION RATE: 16 BRPM

## 2018-08-10 NOTE — ED NOTES
Pt's wife is concerned \"the port doesn't look right\" Skin is intact without induration or exudate. Area is non-tender to touch and palpation and patient appears to be in no distress.

## 2018-08-10 NOTE — ED PROVIDER NOTES
HPI     This 82-year-old male with a history of prostate cancer presenting to the emergency department for evaluation of his right subclavian port. The patient states that he does not wish to be in the emergency department and does not think there is any problem with it, however his wife is concerned about a change in appearance. She states that it is more defined and appears as though the skin around it has \"shrunken\". When asked, she does not know when the last time she visually inspected the port was. She is concerned by how significantly extends out around the skin. There is no surrounding erythema, but it is not tender to palpation there is no swelling. They state that the port is not been accessed for 4-6 months. However he is currently still being treated for cancer and they're supposed to go to oncology next week for ongoing treatment recommendations. Past Medical History:   Diagnosis Date    Anemia     [facility])     prostate cancer.  Dementia     Full dentures     Thyroid disease     hypothyroid. Past Surgical History:   Procedure Laterality Date    COLONOSCOPY N/A 5/15/2017    COLON[facility] Gruber [facility]COPY    HX COLECTOMY      HX COLONOSCOPY      HX ENDOSCOPY      HX HERNIA REPAIR      groin - unknown side          Family History:   Problem Relation Age of Onset    Diabetes Mother     Heart Disease Mother     Heart Disease Father     Hypertension Father        Social History     Social History    Marital status:      Spouse name: N/A    Number of children: N/A    Years of education: N/A     Occupational History    Not on file. Social History Main Topics    Smoking status: Never Smoker    Smokeless tobacco: Never Used      Comment: Smoked about 1 year when he was 27.       Alcohol use No    Drug use: No    Sexual activity: Not on file     Other Topics Concern    Not on file     Social History Narrative ALLERGIES: Shellfish containing products    Review of Systems   Constitutional: Negative for fatigue and fever. HENT: Negative. Eyes: Negative. Respiratory: Negative for cough, chest tightness, shortness of breath and wheezing. Cardiovascular: Negative for chest pain. Gastrointestinal: Negative for abdominal distention, abdominal pain, diarrhea, nausea and vomiting. Genitourinary: Negative for dysuria, flank pain, frequency, genital sores and urgency. Musculoskeletal: Negative. Skin: Negative for rash. Neurological: Negative for dizziness, syncope and headaches. All other systems reviewed and are negative. Vitals:    08/09/18 2255   BP: 182/85   Pulse: (!) 50   Resp: 18   Temp: 97.9 °F (36.6 °C)   SpO2: 100%            Physical Exam   Constitutional: He is oriented to person, place, and time. He appears well-developed and well-nourished. No distress. HENT:   Head: Normocephalic and atraumatic. Eyes: EOM are normal. Pupils are equal, round, and reactive to light. Neck: Normal range of motion. Cardiovascular: Intact distal pulses. Pulmonary/Chest: Effort normal.   Tunneled subclavian catheter on the right chest, no tenderness, no fluctuance, no fluid collection   Musculoskeletal: Normal range of motion. Neurological: He is alert and oriented to person, place, and time. Psychiatric: He has a normal mood and affect. His behavior is normal.   Vitals reviewed. MDM  Number of Diagnoses or Management Options  Encounter for adjustment and management of vascular access device:   Diagnosis management comments: Patient has a very normal-appearing right subclavian port. There is no evidence of infection, infiltration, or other problem related to the port. I recommended that they follow up with oncology as previously arranged.         ED Course       Procedures

## 2018-08-10 NOTE — ED NOTES
I have reviewed discharge instructions with the patient and spouse. The patient and spouse verbalized understanding. Patient left ED via Discharge Method: ambulatory to Home with Abdullahi Vergara, his wife. .    Opportunity for questions and clarification provided. Patient given 0 scripts. To continue your aftercare when you leave the hospital, you may receive an automated call from our care team to check in on how you are doing. This is a free service and part of our promise to provide the best care and service to meet your aftercare needs.  If you have questions, or wish to unsubscribe from this service please call [phone]. Thank you for Choosing our [facility] Emergency Department.

## 2018-08-10 NOTE — DISCHARGE INSTRUCTIONS
Your port appears normal today in the emergency department. I do not think any new change in management would be appropriate at this time. Follow-up with your oncologist as previously scheduled. Return to the ER for any change or new symptoms.

## 2018-11-06 ENCOUNTER — HOSPITAL ENCOUNTER (OUTPATIENT)
Dept: LAB | Age: 83
Discharge: HOME | End: 2018-11-06
Payer: MEDICARE

## 2018-11-06 DIAGNOSIS — C18.4 MALIGNANT NEOPLASM OF TRANSVERSE COLON (HCC): Chronic | ICD-10-CM

## 2018-11-06 LAB
ALBUMIN SERPL-MCNC: 3.4 G/DL (ref 3.2–4.6)
ALBUMIN/GLOB SERPL: 0.9 {RATIO} (ref 1.2–3.5)
ALP SERPL-CCNC: 73 U/L (ref 50–136)
ALT SERPL-CCNC: 18 U/L (ref 12–65)
ANION GAP SERPL CALC-SCNC: 4 MMOL/L (ref 7–16)
AST SERPL-CCNC: 18 U/L (ref 15–37)
BASOPHILS # BLD: 0 K/UL (ref 0–0.2)
BASOPHILS NFR BLD: 0 % (ref 0–2)
BILIRUB SERPL-MCNC: 0.3 MG/DL (ref 0.2–1.1)
BUN SERPL-MCNC: 12 MG/DL (ref 8–23)
CALCIUM SERPL-MCNC: 8.3 MG/DL (ref 8.3–10.4)
CEA SERPL-MCNC: 1.7 NG/ML (ref 0–3)
CHLORIDE SERPL-SCNC: 107 MMOL/L (ref 98–107)
CO2 SERPL-SCNC: 30 MMOL/L (ref 21–32)
CREAT SERPL-MCNC: 1.11 MG/DL (ref 0.8–1.5)
DIFFERENTIAL METHOD BLD: ABNORMAL
EOSINOPHIL # BLD: 0 K/UL (ref 0–0.8)
EOSINOPHIL NFR BLD: 1 % (ref 0.5–7.8)
ERYTHROCYTE [DISTWIDTH] IN BLOOD BY AUTOMATED COUNT: 14.6 % (ref 11.9–14.6)
GLOBULIN SER CALC-MCNC: 4 G/DL (ref 2.3–3.5)
GLUCOSE SERPL-MCNC: 105 MG/DL (ref 65–100)
HCT VFR BLD AUTO: 40.8 % (ref 41.1–50.3)
HGB BLD-MCNC: 13.3 G/DL (ref 13.6–17.2)
IMM GRANULOCYTES # BLD: 0 K/UL (ref 0–0.5)
IMM GRANULOCYTES NFR BLD AUTO: 0 % (ref 0–5)
LYMPHOCYTES # BLD: 1.2 K/UL (ref 0.5–4.6)
LYMPHOCYTES NFR BLD: 36 % (ref 13–44)
MCH RBC QN AUTO: 29.5 PG (ref 26.1–32.9)
MCHC RBC AUTO-ENTMCNC: 32.6 G/DL (ref 31.4–35)
MCV RBC AUTO: 90.5 FL (ref 79.6–97.8)
MONOCYTES # BLD: 0.3 K/UL (ref 0.1–1.3)
MONOCYTES NFR BLD: 10 % (ref 4–12)
NEUTS SEG # BLD: 1.7 K/UL (ref 1.7–8.2)
NEUTS SEG NFR BLD: 53 % (ref 43–78)
NRBC # BLD: 0 K/UL (ref 0–0.2)
PLATELET # BLD AUTO: 193 K/UL (ref 150–450)
PMV BLD AUTO: 10.1 FL (ref 9.4–12.3)
POTASSIUM SERPL-SCNC: 3.8 MMOL/L (ref 3.5–5.1)
PROT SERPL-MCNC: 7.4 G/DL (ref 6.3–8.2)
RBC # BLD AUTO: 4.51 M/UL (ref 4.23–5.67)
SODIUM SERPL-SCNC: 141 MMOL/L (ref 136–145)
WBC # BLD AUTO: 3.2 K/UL (ref 4.3–11.1)

## 2018-11-06 PROCEDURE — 85025 COMPLETE CBC W/AUTO DIFF WBC: CPT

## 2018-11-06 PROCEDURE — 80053 COMPREHEN METABOLIC PANEL: CPT

## 2018-11-06 PROCEDURE — 82378 CARCINOEMBRYONIC ANTIGEN: CPT

## 2018-11-06 PROCEDURE — 36415 COLL VENOUS BLD VENIPUNCTURE: CPT

## 2018-11-12 ENCOUNTER — ANESTHESIA EVENT (OUTPATIENT)
Dept: SURGERY | Age: 83
End: 2018-11-12
Payer: MEDICARE

## 2018-11-12 RX ORDER — SODIUM CHLORIDE, SODIUM LACTATE, POTASSIUM CHLORIDE, CALCIUM CHLORIDE 600; 310; 30; 20 MG/100ML; MG/100ML; MG/100ML; MG/100ML
100 INJECTION, SOLUTION INTRAVENOUS CONTINUOUS
Status: CANCELLED | OUTPATIENT
Start: 2018-11-12

## 2018-11-12 RX ORDER — MIDAZOLAM HYDROCHLORIDE 1 MG/ML
2 INJECTION, SOLUTION INTRAMUSCULAR; INTRAVENOUS ONCE
Status: CANCELLED | OUTPATIENT
Start: 2018-11-12 | End: 2018-11-12

## 2018-11-12 RX ORDER — LIDOCAINE HYDROCHLORIDE 10 MG/ML
0.1 INJECTION INFILTRATION; PERINEURAL AS NEEDED
Status: CANCELLED | OUTPATIENT
Start: 2018-11-12

## 2018-11-12 RX ORDER — ONDANSETRON 2 MG/ML
4 INJECTION INTRAMUSCULAR; INTRAVENOUS ONCE
Status: CANCELLED | OUTPATIENT
Start: 2018-11-12 | End: 2018-11-12

## 2018-11-12 RX ORDER — MIDAZOLAM HYDROCHLORIDE 1 MG/ML
2 INJECTION, SOLUTION INTRAMUSCULAR; INTRAVENOUS
Status: CANCELLED | OUTPATIENT
Start: 2018-11-12 | End: 2018-11-13

## 2018-11-12 RX ORDER — OXYCODONE HYDROCHLORIDE 5 MG/1
10 TABLET ORAL
Status: CANCELLED | OUTPATIENT
Start: 2018-11-12 | End: 2018-11-13

## 2018-11-12 RX ORDER — SODIUM CHLORIDE, SODIUM LACTATE, POTASSIUM CHLORIDE, CALCIUM CHLORIDE 600; 310; 30; 20 MG/100ML; MG/100ML; MG/100ML; MG/100ML
100 INJECTION, SOLUTION INTRAVENOUS CONTINUOUS
Status: CANCELLED | OUTPATIENT
Start: 2018-11-12 | End: 2018-11-13

## 2018-11-12 RX ORDER — DIPHENHYDRAMINE HYDROCHLORIDE 50 MG/ML
12.5 INJECTION, SOLUTION INTRAMUSCULAR; INTRAVENOUS
Status: CANCELLED | OUTPATIENT
Start: 2018-11-12 | End: 2018-11-13

## 2018-11-12 RX ORDER — FENTANYL CITRATE 50 UG/ML
100 INJECTION, SOLUTION INTRAMUSCULAR; INTRAVENOUS ONCE
Status: CANCELLED | OUTPATIENT
Start: 2018-11-12 | End: 2018-11-12

## 2018-11-12 RX ORDER — HYDROMORPHONE HYDROCHLORIDE 2 MG/ML
0.5 INJECTION, SOLUTION INTRAMUSCULAR; INTRAVENOUS; SUBCUTANEOUS
Status: CANCELLED | OUTPATIENT
Start: 2018-11-12

## 2018-11-12 RX ORDER — OXYCODONE HYDROCHLORIDE 5 MG/1
5 TABLET ORAL
Status: CANCELLED | OUTPATIENT
Start: 2018-11-12 | End: 2018-11-13

## 2018-11-12 RX ORDER — ALBUTEROL SULFATE 0.83 MG/ML
2.5 SOLUTION RESPIRATORY (INHALATION) AS NEEDED
Status: CANCELLED | OUTPATIENT
Start: 2018-11-12

## 2018-11-12 RX ORDER — NALOXONE HYDROCHLORIDE 0.4 MG/ML
0.1 INJECTION, SOLUTION INTRAMUSCULAR; INTRAVENOUS; SUBCUTANEOUS AS NEEDED
Status: CANCELLED | OUTPATIENT
Start: 2018-11-12

## 2018-11-13 ENCOUNTER — HOSPITAL ENCOUNTER (OUTPATIENT)
Age: 83
Setting detail: OUTPATIENT SURGERY
Discharge: HOME | End: 2018-11-13
Attending: RADIOLOGY | Admitting: RADIOLOGY
Payer: MEDICARE

## 2018-11-13 ENCOUNTER — ANESTHESIA (OUTPATIENT)
Dept: SURGERY | Age: 83
End: 2018-11-13
Payer: MEDICARE

## 2018-11-13 ENCOUNTER — HOSPITAL ENCOUNTER (OUTPATIENT)
Dept: INTERVENTIONAL RADIOLOGY/VASCULAR | Age: 83
Discharge: HOME | End: 2018-11-13
Attending: INTERNAL MEDICINE
Payer: MEDICARE

## 2018-11-13 VITALS
DIASTOLIC BLOOD PRESSURE: 86 MMHG | SYSTOLIC BLOOD PRESSURE: 183 MMHG | HEIGHT: 70 IN | BODY MASS INDEX: 21.47 KG/M2 | HEART RATE: 49 BPM | OXYGEN SATURATION: 99 % | WEIGHT: 150 LBS | RESPIRATION RATE: 16 BRPM | TEMPERATURE: 98.1 F

## 2018-11-13 DIAGNOSIS — C18.4 MALIGNANT NEOPLASM OF TRANSVERSE COLON (HCC): Chronic | ICD-10-CM

## 2018-11-13 PROCEDURE — 77030031139 HC SUT VCRL2 J&J -A

## 2018-11-13 PROCEDURE — 77030031131 HC SUT MXN P COVD -B

## 2018-11-13 PROCEDURE — 77030037400 HC ADH TISS HI VISC EXOFIN CHMP -B

## 2018-11-13 PROCEDURE — 36590 REMOVAL TUNNELED CV CATH: CPT

## 2018-11-13 RX ORDER — LIDOCAINE HYDROCHLORIDE AND EPINEPHRINE 20; 5 MG/ML; UG/ML
2-20 INJECTION, SOLUTION EPIDURAL; INFILTRATION; INTRACAUDAL; PERINEURAL ONCE
Status: ACTIVE | OUTPATIENT
Start: 2018-11-13 | End: 2018-11-14

## 2018-11-13 NOTE — PROGRESS NOTES
Verbalized understanding for discharge instructions. Right upper chest site intact without bleeding or hematoma. Wife to drive home.

## 2018-11-13 NOTE — PROCEDURES
Department of Interventional Radiology  [phone]        Interventional Radiology Brief Procedure Note    Patient: Mega Portillo MRN: [id number]  SSN: [id number]    YOB: 1935  Age: 82 y.o.   Sex: male      Date of Procedure: 11/13/2018    Pre-Procedure Diagnosis: colon cancer    Post-Procedure Diagnosis: SAME    Procedure(s): Venous Chest Port Removal    Brief Description of Procedure: as above    Performed By: Matthew Lockett PA-C     Assistants: None    Anesthesia:Lidocaine    Estimated Blood Loss: Less than 10ml    Specimens:  None    Implants:  None    Findings: well healed incision    Complications: None    Recommendations: routine wound care     Follow Up: referring MD    Signed By: Matthew Lockett PA-C     November 13, 2018

## 2018-11-13 NOTE — DISCHARGE INSTRUCTIONS
[address]  Department of Interventional Radiology  [address] [place] [address] [address] [facility]  [phone] Office  [phone] Fax    DRAIN/PORT/CATHETER REMOVAL  DISCHARGE INSTRUCTIONS    General Information:     Your doctor has ordered for us to remove your drain, port, or catheter. This could be that you do not need it anymore, it is not doing its job, your physician has decided on another plan for your treatment and/or it may need replacing. Home Care Instructions: You can resume your regular diet and medication regimen. Do not drink alcohol, drive, or make any important legal decisions in the next 24 hours. Do not lift anything heavier than a gallon of milk, or do anything strenuous for the next 24 hours. You will notice a dressing over the site of the removal. This dressing should stay in place until the site is healed. The dressing should be changed at least every 48 hours. You should change the dressing sooner if it becomes soiled or wet. The nurse who discharges you to home should review with you any wound care instructions. Resume your normal level of activity slowly. You may shower after 24 hours, but do not take a bath, swim or immerse yourself in water until the site has healed, and keep the dressing dry with plastic wrap while showering. The site may ooze for a couple of days. This drainage should lessen with each passing day. Call If:     You should call your Physician and/or the Radiology Nurse if you have any bleeding other than a small spot on your bandage. Call if you have any signs of infection, fever, or increased pain at the site of the tube. Call if the oozing increases, if it changes color, or begins to have an odor. Follow-Up Instructions: Please see your ordering doctor as he/she has requested. To Reach Us: If you have any questions about your procedure, please call the Interventional Radiology department at [phone].  After business hours (5pm) and weekends, call the answering service at [phone] and ask for the Radiologist on call to be paged. Interventional Radiology General Nurse Discharge    After general anesthesia or intravenous sedation, for 24 hours or while taking prescription Narcotics:  · Limit your activities  · Do not drive and operate hazardous machinery  · Do not make important personal or business decisions  · Do  not drink alcoholic beverages  · If you have not urinated within 8 hours after discharge, please contact your surgeon on call. * Please give a list of your current medications to your Primary Care Provider. * Please update this list whenever your medications are discontinued, doses are     changed, or new medications (including over-the-counter products) are added. * Please carry medication information at all times in case of emergency situations. These are general instructions for a healthy lifestyle:    No smoking/ No tobacco products/ Avoid exposure to second hand smoke  Surgeon General's Warning:  Quitting smoking now greatly reduces serious risk to your health. Obesity, smoking, and sedentary lifestyle greatly increases your risk for illness  A healthy diet, regular physical exercise & weight monitoring are important for maintaining a healthy lifestyle    You may be retaining fluid if you have a history of heart failure or if you experience any of the following symptoms:  Weight gain of 3 pounds or more overnight or 5 pounds in a week, increased swelling in our hands or feet or shortness of breath while lying flat in bed. Please call your doctor as soon as you notice any of these symptoms; do not wait until your next office visit.     Recognize signs and symptoms of STROKE:  F-face looks uneven    A-arms unable to move or move unevenly    S-speech slurred or non-existent    T-time-call 911 as soon as signs and symptoms begin-DO NOT go       Back to bed or wait to see if you get better-TIME IS BRAIN.         Patient Signature:  Date: 11/13/2018  Discharging Nurse: Noah Wade RN

## 2018-11-13 NOTE — ANESTHESIA PREPROCEDURE EVALUATION
Anesthetic History Review of Systems / Medical History Patient summary reviewed, nursing notes reviewed and pertinent labs reviewed Pulmonary Neuro/Psych Cardiovascular Hypertension: well controlled Exercise tolerance: >4 METS 
  
GI/Hepatic/Renal 
  
 
 
 
 
 
 Endo/Other Hypothyroidism: well controlled Other Findings Physical Exam 
 
Airway Mallampati: II 
TM Distance: 4 - 6 cm Neck ROM: normal range of motion Mouth opening: Normal 
 
 Cardiovascular Regular rate and rhythm,  S1 and S2 normal,  no murmur, click, rub, or gallop Dental 
No notable dental hx Pulmonary Breath sounds clear to auscultation Abdominal 
 
 
 
 Other Findings Anesthetic Plan ASA: 2 Anesthesia type: total IV anesthesia Induction: Intravenous Anesthetic plan and risks discussed with: Patient

## 2018-11-13 NOTE — PROGRESS NOTES
TRANSFER - IN REPORT:    Verbal report received from Kerri Marie on [facility].  being received from IR for routine progression of care      Report consisted of patients Situation, Background, Assessment and   Recommendations(SBAR). Information from the following report(s) Procedure Summary and MAR was reviewed with the receiving nurse. Assessment completed upon patients arrival to unit and care assumed.

## 2018-11-13 NOTE — PROGRESS NOTES
made pre-procedural prayer visit. Pt was alert and verbal appearing comfortable with no pain level expressed or observed. Pt's wife was present for visit.  provided spiritual care through presence, pastoral conversation, prayer, and assurance of prayer.

## 2019-04-02 ENCOUNTER — HOSPITAL ENCOUNTER (INPATIENT)
Age: 84
LOS: 9 days | Discharge: NURSING, REHAB OR LONG-TERM CARE | DRG: 337 | End: 2019-04-11
Attending: EMERGENCY MEDICINE | Admitting: SURGERY
Payer: MEDICARE

## 2019-04-02 ENCOUNTER — APPOINTMENT (OUTPATIENT)
Dept: GENERAL RADIOLOGY | Age: 84
DRG: 337 | End: 2019-04-02
Attending: EMERGENCY MEDICINE
Payer: MEDICARE

## 2019-04-02 ENCOUNTER — APPOINTMENT (OUTPATIENT)
Dept: CT IMAGING | Age: 84
DRG: 337 | End: 2019-04-02
Attending: EMERGENCY MEDICINE
Payer: MEDICARE

## 2019-04-02 DIAGNOSIS — K56.50 SMALL BOWEL OBSTRUCTION DUE TO ADHESIONS (HCC): Primary | ICD-10-CM

## 2019-04-02 PROBLEM — K56.609 SBO (SMALL BOWEL OBSTRUCTION) (HCC): Status: ACTIVE | Noted: 2019-04-02

## 2019-04-02 LAB
ALBUMIN SERPL-MCNC: 4.1 G/DL (ref 3.2–4.6)
ALBUMIN/GLOB SERPL: 0.9 {RATIO}
ALP SERPL-CCNC: 91 U/L (ref 50–136)
ALT SERPL-CCNC: 19 U/L (ref 12–65)
ANION GAP SERPL CALC-SCNC: 6 MMOL/L
AST SERPL-CCNC: 23 U/L (ref 15–37)
BASOPHILS # BLD: 0 K/UL (ref 0–0.2)
BASOPHILS NFR BLD: 0 % (ref 0–2)
BILIRUB SERPL-MCNC: 0.8 MG/DL (ref 0.2–1.1)
BUN SERPL-MCNC: 17 MG/DL (ref 8–23)
CALCIUM SERPL-MCNC: 9.1 MG/DL (ref 8.3–10.4)
CHLORIDE SERPL-SCNC: 103 MMOL/L (ref 98–107)
CO2 SERPL-SCNC: 30 MMOL/L (ref 21–32)
CREAT SERPL-MCNC: 1.69 MG/DL (ref 0.8–1.5)
DIFFERENTIAL METHOD BLD: ABNORMAL
EOSINOPHIL # BLD: 0 K/UL (ref 0–0.8)
EOSINOPHIL NFR BLD: 0 % (ref 0.5–7.8)
ERYTHROCYTE [DISTWIDTH] IN BLOOD BY AUTOMATED COUNT: 15.3 % (ref 11.9–14.6)
GLOBULIN SER CALC-MCNC: 4.7 G/DL (ref 2.3–3.5)
GLUCOSE SERPL-MCNC: 154 MG/DL (ref 65–100)
HCT VFR BLD AUTO: 48 % (ref 41.1–50.3)
HGB BLD-MCNC: 15.8 G/DL (ref 13.6–17.2)
IMM GRANULOCYTES # BLD AUTO: 0 K/UL (ref 0–0.5)
IMM GRANULOCYTES NFR BLD AUTO: 0 % (ref 0–5)
LIPASE SERPL-CCNC: 136 U/L (ref 73–393)
LYMPHOCYTES # BLD: 0.6 K/UL (ref 0.5–4.6)
LYMPHOCYTES NFR BLD: 7 % (ref 13–44)
MCH RBC QN AUTO: 29.4 PG (ref 26.1–32.9)
MCHC RBC AUTO-ENTMCNC: 32.9 G/DL (ref 31.4–35)
MCV RBC AUTO: 89.4 FL (ref 79.6–97.8)
MONOCYTES # BLD: 0.8 K/UL (ref 0.1–1.3)
MONOCYTES NFR BLD: 9 % (ref 4–12)
NEUTS SEG # BLD: 6.9 K/UL (ref 1.7–8.2)
NEUTS SEG NFR BLD: 84 % (ref 43–78)
NRBC # BLD: 0 K/UL (ref 0–0.2)
PLATELET # BLD AUTO: 238 K/UL (ref 150–450)
PMV BLD AUTO: 10 FL (ref 9.4–12.3)
POTASSIUM SERPL-SCNC: 4 MMOL/L (ref 3.5–5.1)
PROT SERPL-MCNC: 8.8 G/DL
RBC # BLD AUTO: 5.37 M/UL (ref 4.23–5.6)
SODIUM SERPL-SCNC: 139 MMOL/L (ref 136–145)
TROPONIN I SERPL-MCNC: <0.02 NG/ML (ref 0.02–0.05)
WBC # BLD AUTO: 8.3 K/UL (ref 4.3–11.1)

## 2019-04-02 PROCEDURE — 99285 EMERGENCY DEPT VISIT HI MDM: CPT | Performed by: EMERGENCY MEDICINE

## 2019-04-02 PROCEDURE — 74022 RADEX COMPL AQT ABD SERIES: CPT

## 2019-04-02 PROCEDURE — 65270000029 HC RM PRIVATE

## 2019-04-02 PROCEDURE — 93005 ELECTROCARDIOGRAM TRACING: CPT | Performed by: EMERGENCY MEDICINE

## 2019-04-02 PROCEDURE — 96374 THER/PROPH/DIAG INJ IV PUSH: CPT | Performed by: EMERGENCY MEDICINE

## 2019-04-02 PROCEDURE — 83690 ASSAY OF LIPASE: CPT

## 2019-04-02 PROCEDURE — 74011636320 HC RX REV CODE- 636/320: Performed by: EMERGENCY MEDICINE

## 2019-04-02 PROCEDURE — 74011000258 HC RX REV CODE- 258: Performed by: EMERGENCY MEDICINE

## 2019-04-02 PROCEDURE — 74177 CT ABD & PELVIS W/CONTRAST: CPT

## 2019-04-02 PROCEDURE — 74011250636 HC RX REV CODE- 250/636

## 2019-04-02 PROCEDURE — 80053 COMPREHEN METABOLIC PANEL: CPT

## 2019-04-02 PROCEDURE — 84484 ASSAY OF TROPONIN QUANT: CPT

## 2019-04-02 PROCEDURE — 85025 COMPLETE CBC W/AUTO DIFF WBC: CPT

## 2019-04-02 PROCEDURE — 74011250636 HC RX REV CODE- 250/636: Performed by: EMERGENCY MEDICINE

## 2019-04-02 RX ORDER — DIPHENHYDRAMINE HYDROCHLORIDE 50 MG/ML
25 INJECTION, SOLUTION INTRAMUSCULAR; INTRAVENOUS
Status: COMPLETED | OUTPATIENT
Start: 2019-04-02 | End: 2019-04-02

## 2019-04-02 RX ORDER — LORAZEPAM 2 MG/ML
1 INJECTION INTRAMUSCULAR
Status: COMPLETED | OUTPATIENT
Start: 2019-04-02 | End: 2019-04-02

## 2019-04-02 RX ORDER — SODIUM CHLORIDE 0.9 % (FLUSH) 0.9 %
10 SYRINGE (ML) INJECTION
Status: COMPLETED | OUTPATIENT
Start: 2019-04-02 | End: 2019-04-02

## 2019-04-02 RX ORDER — HALOPERIDOL 5 MG/ML
10 INJECTION INTRAMUSCULAR
Status: COMPLETED | OUTPATIENT
Start: 2019-04-02 | End: 2019-04-02

## 2019-04-02 RX ORDER — ONDANSETRON 2 MG/ML
4 INJECTION INTRAMUSCULAR; INTRAVENOUS
Status: COMPLETED | OUTPATIENT
Start: 2019-04-02 | End: 2019-04-02

## 2019-04-02 RX ADMIN — Medication 10 ML: at 22:40

## 2019-04-02 RX ADMIN — SODIUM CHLORIDE 1000 ML: 900 INJECTION, SOLUTION INTRAVENOUS at 21:38

## 2019-04-02 RX ADMIN — ONDANSETRON 4 MG: 2 INJECTION INTRAMUSCULAR; INTRAVENOUS at 21:36

## 2019-04-02 RX ADMIN — LORAZEPAM 1 MG: 2 INJECTION INTRAMUSCULAR; INTRAVENOUS at 23:34

## 2019-04-02 RX ADMIN — HALOPERIDOL LACTATE 10 MG: 5 INJECTION INTRAMUSCULAR at 23:37

## 2019-04-02 RX ADMIN — IOPAMIDOL 100 ML: 755 INJECTION, SOLUTION INTRAVENOUS at 22:40

## 2019-04-02 RX ADMIN — DIPHENHYDRAMINE HYDROCHLORIDE 25 MG: 50 INJECTION, SOLUTION INTRAMUSCULAR; INTRAVENOUS at 23:35

## 2019-04-02 RX ADMIN — SODIUM CHLORIDE 100 ML: 900 INJECTION, SOLUTION INTRAVENOUS at 22:40

## 2019-04-02 RX ADMIN — DIATRIZOATE MEGLUMINE AND DIATRIZOATE SODIUM 15 ML: 660; 100 LIQUID ORAL; RECTAL at 21:38

## 2019-04-03 ENCOUNTER — APPOINTMENT (OUTPATIENT)
Dept: GENERAL RADIOLOGY | Age: 84
DRG: 337 | End: 2019-04-03
Attending: SURGERY
Payer: MEDICARE

## 2019-04-03 LAB
ATRIAL RATE: 94 BPM
CALCULATED P AXIS, ECG09: 79 DEGREES
CALCULATED R AXIS, ECG10: -73 DEGREES
CALCULATED T AXIS, ECG11: 98 DEGREES
DIAGNOSIS, 93000: NORMAL
P-R INTERVAL, ECG05: 178 MS
Q-T INTERVAL, ECG07: 358 MS
QRS DURATION, ECG06: 96 MS
QTC CALCULATION (BEZET), ECG08: 447 MS
VENTRICULAR RATE, ECG03: 94 BPM

## 2019-04-03 PROCEDURE — 74011000258 HC RX REV CODE- 258: Performed by: SURGERY

## 2019-04-03 PROCEDURE — 77030032490 HC SLV COMPR SCD KNE COVD -B

## 2019-04-03 PROCEDURE — 77030020253 HC SOL INJ D545NS .05 DEX .45 SAL

## 2019-04-03 PROCEDURE — 51798 US URINE CAPACITY MEASURE: CPT

## 2019-04-03 PROCEDURE — 74018 RADEX ABDOMEN 1 VIEW: CPT

## 2019-04-03 PROCEDURE — 77030011943

## 2019-04-03 PROCEDURE — 65270000029 HC RM PRIVATE

## 2019-04-03 PROCEDURE — 74019 RADEX ABDOMEN 2 VIEWS: CPT

## 2019-04-03 PROCEDURE — 77030008771 HC TU NG SALEM SUMP -A

## 2019-04-03 RX ORDER — HYDROMORPHONE HYDROCHLORIDE 2 MG/ML
0.5 INJECTION, SOLUTION INTRAMUSCULAR; INTRAVENOUS; SUBCUTANEOUS
Status: DISCONTINUED | OUTPATIENT
Start: 2019-04-03 | End: 2019-04-11 | Stop reason: HOSPADM

## 2019-04-03 RX ORDER — DEXTROSE MONOHYDRATE AND SODIUM CHLORIDE 5; .45 G/100ML; G/100ML
50 INJECTION, SOLUTION INTRAVENOUS CONTINUOUS
Status: DISCONTINUED | OUTPATIENT
Start: 2019-04-03 | End: 2019-04-11 | Stop reason: HOSPADM

## 2019-04-03 RX ORDER — ONDANSETRON 2 MG/ML
4 INJECTION INTRAMUSCULAR; INTRAVENOUS
Status: DISCONTINUED | OUTPATIENT
Start: 2019-04-03 | End: 2019-04-11 | Stop reason: HOSPADM

## 2019-04-03 RX ADMIN — DEXTROSE MONOHYDRATE AND SODIUM CHLORIDE 75 ML/HR: 5; .45 INJECTION, SOLUTION INTRAVENOUS at 15:00

## 2019-04-03 RX ADMIN — DEXTROSE MONOHYDRATE AND SODIUM CHLORIDE 75 ML/HR: 5; .45 INJECTION, SOLUTION INTRAVENOUS at 04:31

## 2019-04-03 NOTE — PROGRESS NOTES
Shift assessment completed. Pt is wakes when name is called. Has not responded verbally. Skin warm, dry and intact. Pt is in 2P restraints and mitts due to attempts to pull out his lines. NG tube in place. Pt has not voided since 0600 in/out cath will continue to monitor.

## 2019-04-03 NOTE — ED PROVIDER NOTES
Page 5year-old male brought in by wife. He's had some complaints of intermittent abdominal pain for last 48 hours. 2 episodes of vomiting. No diarrhea or bleeding or fever. Most of history given by the licensed patient has dementia. He states he has surgery for colon cancer in the past also hypertension as well. Also prostate surgery. The history is provided by the patient and the spouse. The history is limited by the condition of the patient. Abdominal Pain This is a new problem. The problem occurs constantly. The problem has not changed since onset. The pain is associated with vomiting. The pain is located in the generalized abdominal region. The pain is moderate. Associated symptoms include nausea and vomiting. Pertinent negatives include no fever, no diarrhea, no hematochezia, no melena, no constipation, no dysuria, no hematuria, no chest pain and no back pain. Nothing worsens the pain. The pain is relieved by nothing. Past Medical History:  
Diagnosis Date  Anemia   
 hx of- prior to cancer dx  Colon cancer ([facility] [address]  Dementia  Full dentures  History of prostate cancer  Hypothyroidism   
 managed well with Synthroid  Port-A-Cath in place Right chest port for chemo  Vitamin D deficiency Past Surgical History:  
Procedure Laterality Date  COLONOSCOPY N/A 5/15/2017 COLONOSCOPY performed by Angelia Serrano MD at [address]  HX COLONOSCOPY    
 HX ENDOSCOPY    
 HX HERNIA REPAIR    
 groin - unknown side Family History:  
Problem Relation Age of Onset  Diabetes Mother  Heart Disease Mother  Heart Disease Father  Hypertension Father Social History Socioeconomic History  Marital status:  Spouse name: Not on file  Number of children: Not on file  Years of education: Not on file  Highest education level: Not on file Occupational History  Not on file Social Needs  Financial resource strain: Not on file  Food insecurity:  
  Worry: Not on file Inability: Not on file  Transportation needs:  
  Medical: Not on file Non-medical: Not on file Tobacco Use  Smoking status: Never Smoker  Smokeless tobacco: Never Used  Tobacco comment: Smoked about 1 year when he was 27. Substance and Sexual Activity  Alcohol use: No  
  Alcohol/week: 0.0 oz  Drug use: No  
 Sexual activity: Not on file Lifestyle  Physical activity:  
  Days per week: Not on file Minutes per session: Not on file  Stress: Not on file Relationships  Social connections:  
  Talks on phone: Not on file Gets together: Not on file Attends Tenriism service: Not on file Active member of club or organization: Not on file Attends meetings of clubs or organizations: Not on file Relationship status: Not on file  Intimate partner violence:  
  Fear of current or ex partner: Not on file Emotionally abused: Not on file Physically abused: Not on file Forced sexual activity: Not on file Other Topics Concern  Not on file Social History Narrative  Not on file ALLERGIES: Shellfish containing products Review of Systems Constitutional: Negative for chills and fever. Respiratory: Negative for cough and shortness of breath. Cardiovascular: Negative for chest pain. Gastrointestinal: Positive for abdominal pain, nausea and vomiting. Negative for constipation, diarrhea, hematochezia and melena. Genitourinary: Negative for difficulty urinating, dysuria and hematuria. Musculoskeletal: Negative for back pain and neck pain. Vitals:  
 04/02/19 1919 BP: 142/87 Pulse: (!) 116 Resp: 17 Temp: 98.2 °F (36.8 °C) SpO2: 98% Weight: 62.6 kg (138 lb) Height: 5' 10\" (1.778 m) Physical Exam  
Constitutional: He is oriented to person, place, and time.  He appears well-developed and well-nourished. No distress. HENT:  
Head: Normocephalic and atraumatic. Right Ear: External ear normal.  
Left Ear: External ear normal.  
Mouth/Throat: Oropharynx is clear and moist. No oropharyngeal exudate. Eyes: Pupils are equal, round, and reactive to light. Conjunctivae and EOM are normal.  
Neck: Normal range of motion. Neck supple. Cardiovascular: Normal rate, regular rhythm and intact distal pulses. No murmur heard. Pulmonary/Chest: Breath sounds normal. No respiratory distress. Abdominal: Soft. Bowel sounds are normal. He exhibits no mass. There is no tenderness. There is no rebound and no guarding. No hernia. Neurological: He is alert and oriented to person, place, and time. Gait normal.  
Nl speech Skin: Skin is warm and dry. Psychiatric: He has a normal mood and affect. His speech is normal.  
Nursing note and vitals reviewed. MDM Number of Diagnoses or Management Options Diagnosis management comments: Patient does not seem to have tenderness or guarding. No pulsatile masses Amount and/or Complexity of Data Reviewed Clinical lab tests: ordered and reviewed Tests in the radiology section of CPT®: ordered and reviewed Independent visualization of images, tracings, or specimens: yes Risk of Complications, Morbidity, and/or Mortality Presenting problems: moderate Diagnostic procedures: low Management options: moderate Patient Progress Patient progress: stable Procedures Results Include: 
 
Recent Results (from the past 24 hour(s)) CBC WITH AUTOMATED DIFF Collection Time: 04/02/19  8:26 PM  
Result Value Ref Range WBC 8.3 4.3 - 11.1 K/uL  
 RBC 5.37 4.23 - 5.6 M/uL  
 HGB 15.8 13.6 - 17.2 g/dL HCT 48.0 41.1 - 50.3 % MCV 89.4 79.6 - 97.8 FL  
 MCH 29.4 26.1 - 32.9 PG  
 MCHC 32.9 31.4 - 35.0 g/dL  
 RDW 15.3 (H) 11.9 - 14.6 % PLATELET 645 858 - 861 K/uL  MPV 10.0 9.4 - 12.3 FL  
 ABSOLUTE NRBC 0.00 0.0 - 0.2 K/uL  
 DF AUTOMATED NEUTROPHILS 84 (H) 43 - 78 % LYMPHOCYTES 7 (L) 13 - 44 % MONOCYTES 9 4.0 - 12.0 % EOSINOPHILS 0 (L) 0.5 - 7.8 % BASOPHILS 0 0.0 - 2.0 % IMMATURE GRANULOCYTES 0 0.0 - 5.0 %  
 ABS. NEUTROPHILS 6.9 1.7 - 8.2 K/UL  
 ABS. LYMPHOCYTES 0.6 0.5 - 4.6 K/UL  
 ABS. MONOCYTES 0.8 0.1 - 1.3 K/UL  
 ABS. EOSINOPHILS 0.0 0.0 - 0.8 K/UL  
 ABS. BASOPHILS 0.0 0.0 - 0.2 K/UL  
 ABS. IMM. GRANS. 0.0 0.0 - 0.5 K/UL METABOLIC PANEL, COMPREHENSIVE Collection Time: 04/02/19  8:26 PM  
Result Value Ref Range Sodium 139 136 - 145 mmol/L Potassium 4.0 3.5 - 5.1 mmol/L Chloride 103 98 - 107 mmol/L  
 CO2 30 21 - 32 mmol/L Anion gap 6 mmol/L Glucose 154 (H) 65 - 100 mg/dL BUN 17 8 - 23 MG/DL Creatinine 1.69 (H) 0.8 - 1.5 MG/DL  
 GFR est AA 50 (L) >60 ml/min/1.73m2 GFR est non-AA 42 ml/min/1.73m2 Calcium 9.1 8.3 - 10.4 MG/DL Bilirubin, total 0.8 0.2 - 1.1 MG/DL  
 ALT (SGPT) 19 12 - 65 U/L  
 AST (SGOT) 23 15 - 37 U/L Alk. phosphatase 91 50 - 136 U/L Protein, total 8.8 g/dL Albumin 4.1 3.2 - 4.6 g/dL Globulin 4.7 (H) 2.3 - 3.5 g/dL A-G Ratio 0.9 LIPASE Collection Time: 04/02/19  8:26 PM  
Result Value Ref Range Lipase 136 73 - 393 U/L  
TROPONIN I Collection Time: 04/02/19  8:26 PM  
Result Value Ref Range Troponin-I, Qt. <0.02 (L) 0.02 - 0.05 NG/ML Xr Abd Acute W 1 V Chest 
 
Result Date: 4/2/2019 History: Abdominal pain EXAM: Abdominal series FINDINGS: The lungs are clear. There are dilated loops of small bowel present measuring up to 5.5 cm. Air seen within the large bowel as well. No free air. IMPRESSION: Multiple dilated loops of small bowel. Findings could represent ileus or obstruction. 9:33 PM 
IV fluids and orally contrasted CT scan ordered. Xr Abd Acute W 1 V Chest 
 
Result Date: 4/2/2019 History: Abdominal pain EXAM: Abdominal series FINDINGS: The lungs are clear. There are dilated loops of small bowel present measuring up to 5.5 cm. Air seen within the large bowel as well. No free air. IMPRESSION: Multiple dilated loops of small bowel. Findings could represent ileus or obstruction. Ct Abd Pelv W Cont Result Date: 4/2/2019 CT ABDOMEN AND PELVIS WITH CONTRAST HISTORY: Abdominal pain and vomiting COMPARISON: X-rays dated 9/4/7785 TECHNIQUE: Helical imaging was performed from the lung bases through the ischial tuberosities during the intravenous infusion of 100 cc of Isovue-370. Oral contrast was administered. Coronal and sagittal reformats were performed. Dose reduction techniques used: Automated exposure control, adjustment of the mAs and/or kVp according to patient's size, and iterative reconstruction techniques. FINDINGS: *  LUNG BASES: Coronary artery disease. Linear atelectasis of left lower lobe. *  LIVER: Simple cysts. *  GALLBLADDER AND BILE DUCTS: Normal. *  SPLEEN: Within normal limits. *  URINARY TRACT: Bilateral simple cysts. *  ADRENALS: Within normal limits. *  PANCREAS: Within normal limits. *  GASTROINTESTINAL TRACT: Distal small bowel obstruction. Status post right hemicolectomy. *  RETROPERITONEUM: Within normal limits. *  PERITONEAL CAVITY AND ABDOMINAL WALL: Within normal limits. *  PELVIS: Small amount of cul-de-sac fluid. *  SPINE / BONES: Within normal limits. *  OTHER COMMENTS: None. IMPRESSION: Distal small bowel obstruction. Simple cyst in the kidneys and liver. Coronary artery disease. Linear atelectasis left lower lobe. Small amount of cul-de-sac fluid. Date of Dictation: 4/2/2019 11:05 PM  
 
11:19 PM 
Discussed with surgery and they will admit.

## 2019-04-03 NOTE — ED NOTES
TRANSFER - OUT REPORT: 
 
Verbal report given to ras on [facility].  being transferred to med surg [phone] for routine progression of care Report consisted of patients Situation, Background, Assessment and  
Recommendations(SBAR). Information from the following report(s) SBAR was reviewed with the receiving nurse. Lines:  
Venous Access Device Power Port 09/07/16 Upper chest (subclavicular area, right (Active) Peripheral IV 04/02/19 Left Antecubital (Active) Site Assessment Clean, dry, & intact 4/2/2019  8:24 PM  
Phlebitis Assessment 0 4/2/2019  8:24 PM  
Infiltration Assessment 0 4/2/2019  8:24 PM  
Dressing Status Clean, dry, & intact 4/2/2019  8:24 PM  
Dressing Type Transparent;Tape 4/2/2019  8:24 PM  
Hub Color/Line Status Pink 4/2/2019  8:24 PM  
Action Taken Blood drawn 4/2/2019  8:24 PM  
  
 
Opportunity for questions and clarification was provided.

## 2019-04-03 NOTE — PROGRESS NOTES
Patient received via stretcher from ER to room 355. Transferred with assist x2 to bed. Patient asleep, awakens with verbal/tactile stimulation. Drowsy when awake. Patient's wife at bedside. Admission database/assessment completed. Skin assessment completed with Alison Sondheimer. No areas of skin breakdown noted. Bed in low position. Call bell and bed alarm in place for safety. Will monitor. For more info, see admission database/assessment.

## 2019-04-03 NOTE — PROGRESS NOTES
04/03/19 0044 Dual Skin Pressure Injury Assessment Dual Skin Pressure Injury Assessment WDL Second Care Provider (Based on [address] [place] [address]) Stacie Gonsalez RN Skin Integumentary Skin Integumentary (WDL) WDL

## 2019-04-03 NOTE — PROGRESS NOTES
NGT placed and attached to intermittent LWS per MD order. Despite having bilateral mittens on, patient continuously attempts to remove NGT. MD (Dr. Yves Jennings) notified. Order for bilateral wrist restraints obtained and placed on patient. Patient's family aware of the need for wrist restraints at this time. Will monitor.

## 2019-04-03 NOTE — PROGRESS NOTES
Patient provided with urinal,unable to void on own. Incontinence brief dry at this time. Bladder scanned, 368cc noted. MD (Dr. Doug Young) made aware. Patient straight cathed as per MD order. 300cc of daphne urine return noted. Will monitor.

## 2019-04-03 NOTE — H&P
Surgery History and Physical 
 
Subjective:  
  
Fide Sanchez is a 82 y.o. male who presents nausea and vomiting with crampy abdominal pain. CT showed distal SBO. Previous colectomy with post op SBO. Demented Wife present. Denies any pain. Past Medical History:  
Diagnosis Date  Anemia   
 hx of- prior to cancer dx  Colon cancer ([address]  Dementia  Full dentures  History of prostate cancer  Hypothyroidism   
 managed well with Synthroid  Port-A-Cath in place Right chest port for chemo  Vitamin D deficiency Past Surgical History:  
Procedure Laterality Date  COLONOSCOPY N/A 5/15/2017 COLONOSCOPY performed by Renetta Murphy MD at [address]  HX COLONOSCOPY    
 HX ENDOSCOPY    
 HX HERNIA REPAIR    
 groin - unknown side Family History Problem Relation Age of Onset  Diabetes Mother  Heart Disease Mother  Heart Disease Father  Hypertension Father Social History Tobacco Use  Smoking status: Never Smoker  Smokeless tobacco: Never Used  Tobacco comment: Smoked about 1 year when he was 27. Substance Use Topics  Alcohol use: No  
  Alcohol/week: 0.0 oz  
  
Prior to Admission medications Medication Sig Start Date End Date Taking? Authorizing Provider  
memantine-donepezil [facility]) 28-10 mg CSpX Take on q day 1/24/19  Yes Rich PERALTA DO  
levothyroxine (SYNTHROID) 50 mcg tablet Take 1 Tab by mouth Daily (before breakfast). 11/7/18  Yes Yoko Mccauley MD  
traZODone (DESYREL) 50 mg tablet Take 50 mg by mouth nightly.  7/16/18  Yes Provider, Historical  
NAMZARIC 28-10 mg CSpX TAKE 1 TABLET BY MOUTH DAILY 7/30/18  Yes Rich PERALTA DO  
divalproex (DEPAKOTE SPRINKLE) 125 mg capsule Take 125 mg by mouth every evening. 5/29/18  Yes Provider, Historical  
melatonin 3 mg tablet as needed 8/2/16  Yes Provider, Historical  
 acetaminophen (TYLENOL) 325 mg tablet TAKE 2 TABLETS BY MOUTH EVERY 8 HOURS AS NEEDED 16   Provider, Historical  
docusate sodium (COLACE) 100 mg capsule TID prn 16   Provider, Historical  
  
Allergies Allergen Reactions  Shellfish Containing Products Rash Review of Systems Gastrointestinal: Positive for abdominal pain, nausea and vomiting. All other systems reviewed and are negative. Objective:  
 
Patient Vitals for the past 8 hrs: 
 BP Temp Pulse Resp SpO2  
19 1150 (!) 158/97 99.9 °F (37.7 °C) 87 16 96 % 19 0748 148/85 98 °F (36.7 °C) 90 16 94 % Temp (24hrs), Av.3 °F (36.8 °C), Min:96.9 °F (36.1 °C), Max:99.9 °F (37.7 °C) Physical Exam  
Constitutional: He is oriented to person, place, and time. He appears well-developed and well-nourished. No distress. HENT:  
Head: Normocephalic and atraumatic. Eyes: Conjunctivae and EOM are normal. Pupils are equal, round, and reactive to light. Neck: Normal range of motion. Neck supple. Cardiovascular: Normal rate and regular rhythm. Pulmonary/Chest: Effort normal and breath sounds normal. No respiratory distress. He has no wheezes. Abdominal: Soft. He exhibits distension. He exhibits no mass. There is no tenderness. There is no rebound and no guarding. Musculoskeletal: Normal range of motion. Neurological: He is alert and oriented to person, place, and time. Skin: Skin is warm and dry. He is not diaphoretic. Psychiatric: He has a normal mood and affect. His behavior is normal. Judgment and thought content normal.  
 
 
Assessment: SBO DHNA:MARIAM DUKES Signed By: Kareem Sun MD   
 April 3, 2019

## 2019-04-03 NOTE — PROGRESS NOTES
Initial visit with patient attempted but patient asleep. Quiet prayer said at bedside and card left. Frankie Steven M.Div.

## 2019-04-03 NOTE — PROGRESS NOTES
Care Management Interventions Transition of Care Consult (CM Consult): Discharge Planning Current Support Network: Lives with Spouse Discharge Location Discharge Placement: Unable to determine at this time Shaina reviewed chart. Pt is an 82 yr old AA male adm yesterday due to N/V abd pain. Pt has a history of colon and prostate cancer and has had a colectomy in the past. Pt has a diagnosis of Dementia and is currently very confused with mittens on. Pt was pulling at IV lines. Surg admitted pt due to a SBO on CT. Pt has NG at present. Conservative measures are being attempted. Unsure how pt's wife handles pt at home. No family present. Sw will need to follow medically and discuss long term plans with family. Sw to cont to follow and assist. Felicity Merritt reviewed Md notes. Wife wanting to hold off on surgery. Md strongly advised against that. Rn later attempted to talk with wife about code status. Wife wants to keep full code until after KUB results tomorrow. Per notes wife doesn't want him to have surg or have a colostomy due to his dementia but wants more information before making decisions. Pt would be Hospice appropriate if comfort measures are her desires. Shaina to cont to follow. Felicity Lambert

## 2019-04-04 ENCOUNTER — APPOINTMENT (OUTPATIENT)
Dept: GENERAL RADIOLOGY | Age: 84
DRG: 337 | End: 2019-04-04
Attending: SURGERY
Payer: MEDICARE

## 2019-04-04 PROCEDURE — 77030020253 HC SOL INJ D545NS .05 DEX .45 SAL

## 2019-04-04 PROCEDURE — 74011000258 HC RX REV CODE- 258: Performed by: SURGERY

## 2019-04-04 PROCEDURE — 74018 RADEX ABDOMEN 1 VIEW: CPT

## 2019-04-04 PROCEDURE — 51798 US URINE CAPACITY MEASURE: CPT

## 2019-04-04 PROCEDURE — 65270000029 HC RM PRIVATE

## 2019-04-04 RX ADMIN — DEXTROSE MONOHYDRATE AND SODIUM CHLORIDE 75 ML/HR: 5; .45 INJECTION, SOLUTION INTRAVENOUS at 07:40

## 2019-04-04 NOTE — PROGRESS NOTES
Discussed with patient's wife code status since patient has dementia. We further discussed complications that Dr. Sathish Oliveros had gone over with family ie ischemia, bowel resection, ostomy bag. Family said they did not want patient to have a ostomy bag due to his dementia and did not want to make a DNR until after follow-up KUB tomorrow

## 2019-04-04 NOTE — PROGRESS NOTES
PM assessment completed via flowsheets. Bilateral restraints and mittens in place due to continuous attempts to remove NGT. NGT attached to intermittent LWS. IV infusing without difficulty. Pt alert but confused. In bed resting comfortably. No complaints at this time. No signs of distress noted. Will continue to monitor with hourly rounding.

## 2019-04-04 NOTE — PROGRESS NOTES
Problem: Non-Violent Restraints Goal: *Removal from restraints as soon as assessed to be safe Outcome: Progressing Towards Goal 
Goal: *No harm/injury to patient while restraints in use Outcome: Progressing Towards Goal 
Goal: *Patient's dignity will be maintained Outcome: Progressing Towards Goal 
Goal: *Patient Specific Goal (EDIT GOAL, INSERT TEXT) Outcome: Progressing Towards Goal 
Goal: Non-violent Restaints:Standard Interventions Outcome: Progressing Towards Goal 
Goal: Non-violent Restraints:Patient Interventions Outcome: Progressing Towards Goal 
Goal: Patient/Family Education Outcome: Progressing Towards Goal 
  
Problem: Falls - Risk of 
Goal: *Absence of Falls Description Document Stephanie Xiao Fall Risk and appropriate interventions in the flowsheet. Outcome: Progressing Towards Goal 
  
Problem: Patient Education: Go to Patient Education Activity Goal: Patient/Family Education Outcome: Progressing Towards Goal

## 2019-04-04 NOTE — PROGRESS NOTES
AVSS  Abd films unchanged. Abd soft,non tender,no rebound. ?flatus. Long discussion with family. Discussed surgery and have a time today around 11:30 am. They do not wish to proceed. I explained to them that waiting may lead to bowel ischemia and possible need for bowel resection and overall a poorer outcome. They understand and still do not wish to proceed.

## 2019-04-05 ENCOUNTER — ANESTHESIA EVENT (OUTPATIENT)
Dept: SURGERY | Age: 84
DRG: 337 | End: 2019-04-05
Payer: MEDICARE

## 2019-04-05 ENCOUNTER — APPOINTMENT (OUTPATIENT)
Dept: GENERAL RADIOLOGY | Age: 84
DRG: 337 | End: 2019-04-05
Attending: SURGERY
Payer: MEDICARE

## 2019-04-05 ENCOUNTER — ANESTHESIA (OUTPATIENT)
Dept: SURGERY | Age: 84
DRG: 337 | End: 2019-04-05
Payer: MEDICARE

## 2019-04-05 LAB
ANION GAP SERPL CALC-SCNC: 9 MMOL/L
BUN SERPL-MCNC: 30 MG/DL (ref 8–23)
CALCIUM SERPL-MCNC: 8.9 MG/DL (ref 8.3–10.4)
CHLORIDE SERPL-SCNC: 102 MMOL/L (ref 98–107)
CO2 SERPL-SCNC: 27 MMOL/L (ref 21–32)
CREAT SERPL-MCNC: 1.71 MG/DL (ref 0.8–1.5)
ERYTHROCYTE [DISTWIDTH] IN BLOOD BY AUTOMATED COUNT: 15 % (ref 11.9–14.6)
GLUCOSE SERPL-MCNC: 144 MG/DL (ref 65–100)
HCT VFR BLD AUTO: 44 % (ref 41.1–50.3)
HGB BLD-MCNC: 14.6 G/DL (ref 13.6–17.2)
MCH RBC QN AUTO: 29 PG (ref 26.1–32.9)
MCHC RBC AUTO-ENTMCNC: 33.2 G/DL (ref 31.4–35)
MCV RBC AUTO: 87.3 FL (ref 79.6–97.8)
NRBC # BLD: 0 K/UL (ref 0–0.2)
PLATELET # BLD AUTO: 192 K/UL (ref 150–450)
PMV BLD AUTO: 10.8 FL (ref 9.4–12.3)
POTASSIUM SERPL-SCNC: 3.5 MMOL/L (ref 3.5–5.1)
RBC # BLD AUTO: 5.04 M/UL (ref 4.23–5.6)
SODIUM SERPL-SCNC: 138 MMOL/L (ref 136–145)
WBC # BLD AUTO: 7.7 K/UL (ref 4.3–11.1)

## 2019-04-05 PROCEDURE — 76210000006 HC OR PH I REC 0.5 TO 1 HR: Performed by: SURGERY

## 2019-04-05 PROCEDURE — 74011000258 HC RX REV CODE- 258: Performed by: SURGERY

## 2019-04-05 PROCEDURE — 80048 BASIC METABOLIC PNL TOTAL CA: CPT

## 2019-04-05 PROCEDURE — 74011250636 HC RX REV CODE- 250/636

## 2019-04-05 PROCEDURE — 74019 RADEX ABDOMEN 2 VIEWS: CPT

## 2019-04-05 PROCEDURE — 76060000035 HC ANESTHESIA 2 TO 2.5 HR: Performed by: SURGERY

## 2019-04-05 PROCEDURE — 77030002966 HC SUT PDS J&J -A: Performed by: SURGERY

## 2019-04-05 PROCEDURE — 0DNW0ZZ RELEASE PERITONEUM, OPEN APPROACH: ICD-10-PCS | Performed by: SURGERY

## 2019-04-05 PROCEDURE — 77030008468 HC STPLR SKN VISIS TELE -A: Performed by: SURGERY

## 2019-04-05 PROCEDURE — 65270000029 HC RM PRIVATE

## 2019-04-05 PROCEDURE — 77030008477 HC STYL SATN SLP COVD -A: Performed by: ANESTHESIOLOGY

## 2019-04-05 PROCEDURE — 77030008703 HC TU ET UNCUF COVD -A: Performed by: ANESTHESIOLOGY

## 2019-04-05 PROCEDURE — 74011000250 HC RX REV CODE- 250

## 2019-04-05 PROCEDURE — 76010000171 HC OR TIME 2 TO 2.5 HR INTENSV-TIER 1: Performed by: SURGERY

## 2019-04-05 PROCEDURE — 0DNB0ZZ RELEASE ILEUM, OPEN APPROACH: ICD-10-PCS | Performed by: SURGERY

## 2019-04-05 PROCEDURE — 77030032490 HC SLV COMPR SCD KNE COVD -B: Performed by: SURGERY

## 2019-04-05 PROCEDURE — 85027 COMPLETE CBC AUTOMATED: CPT

## 2019-04-05 PROCEDURE — 77030019940 HC BLNKT HYPOTHRM STRY -B: Performed by: ANESTHESIOLOGY

## 2019-04-05 PROCEDURE — 77030034849: Performed by: SURGERY

## 2019-04-05 PROCEDURE — 77030002996 HC SUT SLK J&J -A: Performed by: SURGERY

## 2019-04-05 PROCEDURE — 77030018836 HC SOL IRR NACL ICUM -A: Performed by: SURGERY

## 2019-04-05 PROCEDURE — 74011250636 HC RX REV CODE- 250/636: Performed by: SURGERY

## 2019-04-05 PROCEDURE — 36415 COLL VENOUS BLD VENIPUNCTURE: CPT

## 2019-04-05 PROCEDURE — 77030039425 HC BLD LARYNG TRULITE DISP TELE -A: Performed by: ANESTHESIOLOGY

## 2019-04-05 PROCEDURE — 77030011266 HC ELECTRD BLD INSL COVD -A: Performed by: SURGERY

## 2019-04-05 RX ORDER — GLYCOPYRROLATE 0.2 MG/ML
INJECTION INTRAMUSCULAR; INTRAVENOUS AS NEEDED
Status: DISCONTINUED | OUTPATIENT
Start: 2019-04-05 | End: 2019-04-05 | Stop reason: HOSPADM

## 2019-04-05 RX ORDER — LIDOCAINE HYDROCHLORIDE 20 MG/ML
INJECTION, SOLUTION EPIDURAL; INFILTRATION; INTRACAUDAL; PERINEURAL AS NEEDED
Status: DISCONTINUED | OUTPATIENT
Start: 2019-04-05 | End: 2019-04-05 | Stop reason: HOSPADM

## 2019-04-05 RX ORDER — ACETAMINOPHEN 10 MG/ML
INJECTION, SOLUTION INTRAVENOUS AS NEEDED
Status: DISCONTINUED | OUTPATIENT
Start: 2019-04-05 | End: 2019-04-05 | Stop reason: HOSPADM

## 2019-04-05 RX ORDER — SUCCINYLCHOLINE CHLORIDE 20 MG/ML
INJECTION INTRAMUSCULAR; INTRAVENOUS AS NEEDED
Status: DISCONTINUED | OUTPATIENT
Start: 2019-04-05 | End: 2019-04-05 | Stop reason: HOSPADM

## 2019-04-05 RX ORDER — ONDANSETRON 2 MG/ML
INJECTION INTRAMUSCULAR; INTRAVENOUS AS NEEDED
Status: DISCONTINUED | OUTPATIENT
Start: 2019-04-05 | End: 2019-04-05 | Stop reason: HOSPADM

## 2019-04-05 RX ORDER — ROCURONIUM BROMIDE 10 MG/ML
INJECTION, SOLUTION INTRAVENOUS AS NEEDED
Status: DISCONTINUED | OUTPATIENT
Start: 2019-04-05 | End: 2019-04-05 | Stop reason: HOSPADM

## 2019-04-05 RX ORDER — PROPOFOL 10 MG/ML
INJECTION, EMULSION INTRAVENOUS AS NEEDED
Status: DISCONTINUED | OUTPATIENT
Start: 2019-04-05 | End: 2019-04-05 | Stop reason: HOSPADM

## 2019-04-05 RX ORDER — SODIUM CHLORIDE, SODIUM LACTATE, POTASSIUM CHLORIDE, CALCIUM CHLORIDE 600; 310; 30; 20 MG/100ML; MG/100ML; MG/100ML; MG/100ML
INJECTION, SOLUTION INTRAVENOUS
Status: DISCONTINUED | OUTPATIENT
Start: 2019-04-05 | End: 2019-04-05 | Stop reason: HOSPADM

## 2019-04-05 RX ORDER — FENTANYL CITRATE 50 UG/ML
INJECTION, SOLUTION INTRAMUSCULAR; INTRAVENOUS AS NEEDED
Status: DISCONTINUED | OUTPATIENT
Start: 2019-04-05 | End: 2019-04-05 | Stop reason: HOSPADM

## 2019-04-05 RX ORDER — LABETALOL HYDROCHLORIDE 5 MG/ML
INJECTION, SOLUTION INTRAVENOUS AS NEEDED
Status: DISCONTINUED | OUTPATIENT
Start: 2019-04-05 | End: 2019-04-05 | Stop reason: HOSPADM

## 2019-04-05 RX ORDER — NEOSTIGMINE METHYLSULFATE 1 MG/ML
INJECTION INTRAVENOUS AS NEEDED
Status: DISCONTINUED | OUTPATIENT
Start: 2019-04-05 | End: 2019-04-05 | Stop reason: HOSPADM

## 2019-04-05 RX ADMIN — FENTANYL CITRATE 50 MCG: 50 INJECTION, SOLUTION INTRAMUSCULAR; INTRAVENOUS at 12:59

## 2019-04-05 RX ADMIN — ROCURONIUM BROMIDE 35 MG: 10 INJECTION, SOLUTION INTRAVENOUS at 12:47

## 2019-04-05 RX ADMIN — NEOSTIGMINE METHYLSULFATE 5 MG: 1 INJECTION INTRAVENOUS at 13:57

## 2019-04-05 RX ADMIN — GLYCOPYRROLATE 0.6 MG: 0.2 INJECTION INTRAMUSCULAR; INTRAVENOUS at 13:57

## 2019-04-05 RX ADMIN — FENTANYL CITRATE 100 MCG: 50 INJECTION, SOLUTION INTRAMUSCULAR; INTRAVENOUS at 13:27

## 2019-04-05 RX ADMIN — SUCCINYLCHOLINE CHLORIDE 160 MG: 20 INJECTION INTRAMUSCULAR; INTRAVENOUS at 12:32

## 2019-04-05 RX ADMIN — DEXTROSE MONOHYDRATE AND SODIUM CHLORIDE 100 ML/HR: 5; .45 INJECTION, SOLUTION INTRAVENOUS at 20:34

## 2019-04-05 RX ADMIN — SODIUM CHLORIDE, SODIUM LACTATE, POTASSIUM CHLORIDE, CALCIUM CHLORIDE: 600; 310; 30; 20 INJECTION, SOLUTION INTRAVENOUS at 13:08

## 2019-04-05 RX ADMIN — FENTANYL CITRATE 50 MCG: 50 INJECTION, SOLUTION INTRAMUSCULAR; INTRAVENOUS at 12:32

## 2019-04-05 RX ADMIN — DEXTROSE MONOHYDRATE AND SODIUM CHLORIDE 100 ML/HR: 5; .45 INJECTION, SOLUTION INTRAVENOUS at 05:06

## 2019-04-05 RX ADMIN — SODIUM CHLORIDE, SODIUM LACTATE, POTASSIUM CHLORIDE, CALCIUM CHLORIDE: 600; 310; 30; 20 INJECTION, SOLUTION INTRAVENOUS at 12:25

## 2019-04-05 RX ADMIN — SODIUM CHLORIDE, SODIUM LACTATE, POTASSIUM CHLORIDE, CALCIUM CHLORIDE: 600; 310; 30; 20 INJECTION, SOLUTION INTRAVENOUS at 14:13

## 2019-04-05 RX ADMIN — PROPOFOL 150 MG: 10 INJECTION, EMULSION INTRAVENOUS at 12:32

## 2019-04-05 RX ADMIN — ROCURONIUM BROMIDE 5 MG: 10 INJECTION, SOLUTION INTRAVENOUS at 12:32

## 2019-04-05 RX ADMIN — LABETALOL HYDROCHLORIDE 10 MG: 5 INJECTION, SOLUTION INTRAVENOUS at 14:25

## 2019-04-05 RX ADMIN — CEFOXITIN 2 G: 2 INJECTION, POWDER, FOR SOLUTION INTRAVENOUS at 12:35

## 2019-04-05 RX ADMIN — LIDOCAINE HYDROCHLORIDE 80 MG: 20 INJECTION, SOLUTION EPIDURAL; INFILTRATION; INTRACAUDAL; PERINEURAL at 12:32

## 2019-04-05 RX ADMIN — ROPIVACAINE HYDROCHLORIDE 80 MG: 2 INJECTION, SOLUTION EPIDURAL; INFILTRATION; PERINEURAL at 14:15

## 2019-04-05 RX ADMIN — ONDANSETRON 4 MG: 2 INJECTION INTRAMUSCULAR; INTRAVENOUS at 13:55

## 2019-04-05 RX ADMIN — ACETAMINOPHEN 1000 MG: 10 INJECTION, SOLUTION INTRAVENOUS at 13:00

## 2019-04-05 RX ADMIN — LABETALOL HYDROCHLORIDE 10 MG: 5 INJECTION, SOLUTION INTRAVENOUS at 13:29

## 2019-04-05 RX ADMIN — FENTANYL CITRATE 100 MCG: 50 INJECTION, SOLUTION INTRAMUSCULAR; INTRAVENOUS at 13:05

## 2019-04-05 NOTE — PROGRESS NOTES
Shift assessment complete via flowsheets. Sitter currently at bedside. Alert to self. Calm but restless. Pt in restraints due continuous attempts to remove NGT. NGT in place at low suction. Denies needs at this time. No distress noted. Will continue to monitor.

## 2019-04-05 NOTE — ROUTINE PROCESS
TRANSFER - OUT REPORT: 
 
Verbal report given to LIZBETH(name) on [employer].  being transferred to MED/SURG(unit) for routine post - op Report consisted of patients Situation, Background, Assessment and  
Recommendations(SBAR). Information from the following report(s) SBAR, Kardex, OR Summary, Intake/Output, MAR, Recent Results and Cardiac Rhythm SINUS RHYTHM was reviewed with the receiving nurse. Lines:  
Peripheral IV 04/05/19 Right Arm (Active) Site Assessment Clean, dry, & intact 4/5/2019  2:36 PM  
Phlebitis Assessment 0 4/5/2019  2:36 PM  
Infiltration Assessment 0 4/5/2019  2:36 PM  
Dressing Status Clean, dry, & intact 4/5/2019  2:36 PM  
Dressing Type Transparent;Tape 4/5/2019  2:36 PM  
Hub Color/Line Status Green;Patent; Infusing 4/5/2019  2:36 PM  
  
 
Opportunity for questions and clarification was provided. Patient transported with: 
 O2 @ 2 liters

## 2019-04-05 NOTE — ANESTHESIA PREPROCEDURE EVALUATION
Anesthetic History No history of anesthetic complications Review of Systems / Medical History Patient summary reviewed, nursing notes reviewed and pertinent labs reviewed Pulmonary Within defined limits Neuro/Psych Dementia Cardiovascular Hypertension: well controlled Exercise tolerance: >4 METS 
  
GI/Hepatic/Renal 
  
 
 
 
 
 
 Endo/Other Hypothyroidism: well controlled Cancer Other Findings Comments: Colon cancer Physical Exam 
 
Airway Mallampati: II 
TM Distance: 4 - 6 cm Neck ROM: normal range of motion Mouth opening: Normal 
 
 Cardiovascular Regular rate and rhythm,  S1 and S2 normal,  no murmur, click, rub, or gallop Rhythm: regular Dental 
 
Dentition: Full upper dentures and Full lower dentures Pulmonary Breath sounds clear to auscultation Abdominal 
 
 
 
 Other Findings Anesthetic History No history of anesthetic complications Review of Systems / Medical History Patient summary reviewed, nursing notes reviewed and pertinent labs reviewed Pulmonary Within defined limits Neuro/Psych Within defined limits Dementia Cardiovascular Hypertension: well controlled Hyperlipidemia Exercise tolerance: >4 METS 
  
GI/Hepatic/Renal 
Within defined limits Comments: Prostate CA 
 
3 mos S/P colectomy for CA  Endo/Other Hypothyroidism: well controlled Other Findings Comments: Anemia Physical Exam 
 
Airway Mallampati: II 
TM Distance: 4 - 6 cm Neck ROM: normal range of motion Mouth opening: Normal 
 
 Cardiovascular Rhythm: regular Rate: normal 
 
 
 
 Dental 
No notable dental hx Pulmonary Breath sounds clear to auscultation Abdominal 
 
 
 
 Other Findings Anesthetic Plan ASA: 3 Anesthesia type: total IV anesthesia Anesthetic plan and risks discussed with: Patient and Spouse Anesthetic Plan ASA: 3 Anesthesia type: general 
 
 
 
 
Induction: Intravenous Anesthetic plan and risks discussed with: Patient and Family

## 2019-04-05 NOTE — PROGRESS NOTES
AVSS  No pain  No flatus  Abd films unchanged  BUN and Creat rising. Discussed with wife,she agrees to proceed with surgery. Mr Rd Rothman agrees as well. Proceed with exploratory laparotomy,possible bowel resection,possible ostomy.

## 2019-04-05 NOTE — PROGRESS NOTES
TRANSFER - IN REPORT: 
 
Verbal report received from [facility] (name) on [employer].  being received from 3rd med-surg (unit) for ordered procedure Report consisted of patients Situation, Background, Assessment and  
Recommendations(SBAR). Information from the following report(s) SBAR, Kardex and MAR was reviewed with the receiving nurse. Opportunity for questions and clarification was provided. Assessment completed upon patients arrival to unit and care assumed.

## 2019-04-05 NOTE — PROGRESS NOTES
Shift Assessment - Patient is alert to self. Patient has been calm at this time. Resp even and unlabored. Continues restrains for attempts to remove IV and NGT. NGT set to low suction. No complaint of pain at this time. Currently resting in a low, locked bed. Call light in reach.

## 2019-04-06 LAB
ANION GAP SERPL CALC-SCNC: 7 MMOL/L
BUN SERPL-MCNC: 23 MG/DL (ref 8–23)
CALCIUM SERPL-MCNC: 8 MG/DL (ref 8.3–10.4)
CHLORIDE SERPL-SCNC: 104 MMOL/L (ref 98–107)
CO2 SERPL-SCNC: 28 MMOL/L (ref 21–32)
CREAT SERPL-MCNC: 1.15 MG/DL (ref 0.8–1.5)
GLUCOSE SERPL-MCNC: 148 MG/DL (ref 65–100)
POTASSIUM SERPL-SCNC: 3.8 MMOL/L (ref 3.5–5.1)
SODIUM SERPL-SCNC: 139 MMOL/L (ref 136–145)

## 2019-04-06 PROCEDURE — 74011000258 HC RX REV CODE- 258: Performed by: SURGERY

## 2019-04-06 PROCEDURE — 36415 COLL VENOUS BLD VENIPUNCTURE: CPT

## 2019-04-06 PROCEDURE — 80048 BASIC METABOLIC PNL TOTAL CA: CPT

## 2019-04-06 PROCEDURE — 65270000029 HC RM PRIVATE

## 2019-04-06 PROCEDURE — 77030027138 HC INCENT SPIROMETER -A

## 2019-04-06 PROCEDURE — 74011250636 HC RX REV CODE- 250/636: Performed by: SURGERY

## 2019-04-06 PROCEDURE — 77030020253 HC SOL INJ D545NS .05 DEX .45 SAL

## 2019-04-06 RX ORDER — ROPIVACAINE HYDROCHLORIDE 2 MG/ML
INJECTION, SOLUTION EPIDURAL; INFILTRATION; PERINEURAL
Status: DISCONTINUED | OUTPATIENT
Start: 2019-04-05 | End: 2019-04-06 | Stop reason: HOSPADM

## 2019-04-06 RX ADMIN — HYDROMORPHONE HYDROCHLORIDE 0.5 MG: 2 INJECTION, SOLUTION INTRAMUSCULAR; INTRAVENOUS; SUBCUTANEOUS at 03:17

## 2019-04-06 RX ADMIN — DEXTROSE MONOHYDRATE AND SODIUM CHLORIDE 100 ML/HR: 5; .45 INJECTION, SOLUTION INTRAVENOUS at 16:15

## 2019-04-06 RX ADMIN — DEXTROSE MONOHYDRATE AND SODIUM CHLORIDE 100 ML/HR: 5; .45 INJECTION, SOLUTION INTRAVENOUS at 06:47

## 2019-04-06 RX ADMIN — HYDROMORPHONE HYDROCHLORIDE 0.5 MG: 2 INJECTION, SOLUTION INTRAMUSCULAR; INTRAVENOUS; SUBCUTANEOUS at 14:32

## 2019-04-06 NOTE — ANESTHESIA POSTPROCEDURE EVALUATION
Procedure(s): LAPAROTOMY EXPLORATORY , LYSIS OF ADHESIONS AND LYSIS OF ADHESIVE BAND. general 
 
Anesthesia Post Evaluation Multimodal analgesia: multimodal analgesia used between 6 hours prior to anesthesia start to PACU discharge Patient location during evaluation: PACU Patient participation: complete - patient participated Level of consciousness: awake and alert Pain management: adequate Airway patency: patent Anesthetic complications: no 
Cardiovascular status: acceptable Respiratory status: acceptable Hydration status: acceptable Post anesthesia nausea and vomiting:  none Vitals Value Taken Time /80 4/5/2019  3:22 PM  
Temp 36.9 °C (98.4 °F) 4/5/2019  2:36 PM  
Pulse 85 4/5/2019  3:22 PM  
Resp 16 4/5/2019  3:22 PM  
SpO2 98 % 4/5/2019  3:22 PM

## 2019-04-06 NOTE — OP NOTES
[address] [address]  OPERATIVE REPORT    Name:  Nicolás Ferraro  MR#:  [id number]  :  1935  ACCOUNT #:  [de-identified]  DATE OF SERVICE:  2019    PREOPERATIVE DIAGNOSIS:  Small bowel obstruction. POSTOPERATIVE DIAGNOSIS:  Small bowel obstruction secondary to an adhesive band. PROCEDURE PERFORMED:  Exploratory laparotomy with lysis of adhesions for small bowel obstruction. SURGEON:  Rj Garces MD    ASSISTANT:  None. ANESTHESIA:  General.    COMPLICATIONS:  None. COUNT:  Correct    SPECIMENS REMOVED:  None. IMPLANTS:  None. ESTIMATED BLOOD LOSS:  Minimal.    PROCEDURE:  After the patient was asleep, the abdomen was prepped and draped in sterile fashion. Time-out was carried out and all were in agreement. An old incision was utilized in the midline, abdominal cavity entered. There were dense adhesions, and these were very carefully taken down sharply. Once I got these off the anterior abdominal wall, I continued taking down adhesions until I got into a free space. Dilated, obstructed bowel was gradually mobilized up out of the wound that I could visualize in the right lower quadrant down in the pelvis, completely decompressed the ileum. I dissected down to this level and there was an adhesive band around the distal ileum, this was cut. There were no other areas of obstruction. NG tube was advanced into the stomach and confirmed. At this time, the bowel was returned to the original position into the abdominal cavity, double stranded #1 PDS was used to close the fascia. Staples were used to close the skin. The patient tolerated the procedure well.           Demetra Moran MD      [id number]  D:  2019 14:05  T:  2019 14:48  JOB #:  [id number]

## 2019-04-06 NOTE — ANESTHESIA PROCEDURE NOTES
Bilateral TAP Block Start time: 4/5/2019 2:10 PM 
End time: 4/5/2019 2:15 PM 
Performed by: Rae Ross MD 
Authorized by: Rae Ross MD  
 
 
Pre-procedure: Indications: post-op pain management Preanesthetic Checklist: patient identified, risks and benefits discussed, site marked, timeout performed, anesthesia consent given and patient being monitored Timeout Time: 14:10 Block Type:  
Block Type:  TAP Laterality:  Left and right Monitoring:  Standard ASA monitoring, continuous pulse ox, frequent vital sign checks, heart rate and oxygen Injection Technique:  Single shot Procedures: ultrasound guided Patient Position: supine Prep: chlorhexidine Location:  Abdominal 
Needle Type:  Stimuplex Needle Gauge:  22 G Needle Localization:  Ultrasound guidance Assessment: 
Number of attempts:  1 Injection Assessment:  Incremental injection every 5 mL, negative aspiration for CSF, no paresthesia, negative aspiration for blood, no intravascular symptoms and low pressure verified by pressure monitor Patient tolerance:  Patient tolerated the procedure well with no immediate complications No gross abnormalities noted during active ultrasound scanning

## 2019-04-06 NOTE — PROGRESS NOTES
Admit Date: 2019 POD 1 Day Post-Op Procedure:  Procedure(s): LAPAROTOMY EXPLORATORY , LYSIS OF ADHESIONS AND LYSIS OF ADHESIVE BAND Subjective:  
 
Patient has complaints of pain. No n/v. Family asking about surgical procedure/indications. Objective:  
 
Visit Vitals /73 (BP 1 Location: Left arm, BP Patient Position: At rest;Head of bed elevated (Comment degrees)) Pulse 73 Temp 98.9 °F (37.2 °C) Resp 18 Ht 5' 10\" (1.778 m) Wt 138 lb (62.6 kg) SpO2 99% BMI 19.80 kg/m² Temp (24hrs), Av.6 °F (37 °C), Min:98.2 °F (36.8 °C), Max:98.9 °F (37.2 °C) Austin Anderson I&O reviewed as documented. Physical Exam:  
 General-in no distress. NGT appears to have been pulled out 15+cm Lungs-CTA bilaterally without rales, rhonchi, or wheezes. Respiratory effort is Normal 
 
Heart- Regular rate and rhythm Abdomen- Incision is/(trocar sites are) clean, dry, no drainage and pt has moderate appropriately distributed  marin-incisional tenderness. Bowel sounds are not present . Labs:  
Recent Results (from the past 24 hour(s)) METABOLIC PANEL, BASIC Collection Time: 19  6:03 AM  
Result Value Ref Range Sodium 139 136 - 145 mmol/L Potassium 3.8 3.5 - 5.1 mmol/L Chloride 104 98 - 107 mmol/L  
 CO2 28 21 - 32 mmol/L Anion gap 7 mmol/L Glucose 148 (H) 65 - 100 mg/dL BUN 23 8 - 23 MG/DL Creatinine 1.15 0.8 - 1.5 MG/DL  
 GFR est AA >60 >60 ml/min/1.73m2 GFR est non-AA >60 ml/min/1.73m2 Calcium 8.0 (L) 8.3 - 10.4 MG/DL Data Review - Assessment:  
 
Principal Problem: 
  SBO (small bowel obstruction) ([facility] [address] (2019) Plan/Recommendations/Medical Decision Making:  
 
Continue present treatment Reposition NGT

## 2019-04-07 PROCEDURE — 74011000258 HC RX REV CODE- 258: Performed by: SURGERY

## 2019-04-07 PROCEDURE — 65270000029 HC RM PRIVATE

## 2019-04-07 PROCEDURE — 77030020253 HC SOL INJ D545NS .05 DEX .45 SAL

## 2019-04-07 PROCEDURE — 76937 US GUIDE VASCULAR ACCESS: CPT

## 2019-04-07 PROCEDURE — C1751 CATH, INF, PER/CENT/MIDLINE: HCPCS

## 2019-04-07 PROCEDURE — 74011250636 HC RX REV CODE- 250/636: Performed by: SURGERY

## 2019-04-07 RX ORDER — SODIUM CHLORIDE 0.9 % (FLUSH) 0.9 %
10 SYRINGE (ML) INJECTION AS NEEDED
Status: DISCONTINUED | OUTPATIENT
Start: 2019-04-07 | End: 2019-04-11 | Stop reason: HOSPADM

## 2019-04-07 RX ORDER — SODIUM CHLORIDE 0.9 % (FLUSH) 0.9 %
10 SYRINGE (ML) INJECTION EVERY 8 HOURS
Status: DISCONTINUED | OUTPATIENT
Start: 2019-04-07 | End: 2019-04-11 | Stop reason: HOSPADM

## 2019-04-07 RX ADMIN — ONDANSETRON 4 MG: 2 INJECTION INTRAMUSCULAR; INTRAVENOUS at 19:46

## 2019-04-07 RX ADMIN — DEXTROSE MONOHYDRATE AND SODIUM CHLORIDE 100 ML/HR: 5; .45 INJECTION, SOLUTION INTRAVENOUS at 18:08

## 2019-04-07 RX ADMIN — Medication 10 ML: at 22:02

## 2019-04-07 RX ADMIN — HYDROMORPHONE HYDROCHLORIDE 0.5 MG: 2 INJECTION, SOLUTION INTRAMUSCULAR; INTRAVENOUS; SUBCUTANEOUS at 19:45

## 2019-04-07 RX ADMIN — HYDROMORPHONE HYDROCHLORIDE 0.5 MG: 2 INJECTION, SOLUTION INTRAMUSCULAR; INTRAVENOUS; SUBCUTANEOUS at 22:31

## 2019-04-07 NOTE — PROGRESS NOTES
MIDLINE Placement Note PRE-PROCEDURE VERIFICATION 
PROCEDURE DETAIL Time out completed with Pb Eros, RN VAT and everyone in agreement with procedure. A single lumen Midline was started for vascular access. The following documentation is in addition to the Midline properties in the lines/airways flowsheet : 
Lot #: [id number] Xylocaine used: Yes Mid-Arm Circumference: 30 (cm) Internal Catheter Length: 8 (cm) Internal Catheter Total Length: 8 (cm) Vein Selection for Midline:left basilic Line is okay to use:

## 2019-04-07 NOTE — PROGRESS NOTES
NG tube pulled out despite mittens and dominique wrist restraints. This is at least the 3rd time. Call made to Seema Nair NP stated leave out have Dr. Stu Patel address.

## 2019-04-07 NOTE — PROGRESS NOTES
Shift assessment completed. No acute distress noted at the time of assessment. Will continue to monitor.

## 2019-04-07 NOTE — PROGRESS NOTES
Admit Date: 2019 POD 2 Days Post-Op Procedure:  Procedure(s): LAPAROTOMY EXPLORATORY , LYSIS OF ADHESIONS AND LYSIS OF ADHESIVE BAND Subjective:  
 
Patient has pulled out NGT (again). No flatus noted by staff or family, and he denies. Objective:  
 
Visit Vitals /71 (BP 1 Location: Left arm, BP Patient Position: At rest;Supine) Pulse 66 Temp 96.8 °F (36 °C) Resp 20 Ht 5' 10\" (1.778 m) Wt 138 lb (62.6 kg) SpO2 97% BMI 19.80 kg/m² Temp (24hrs), Av.5 °F (36.4 °C), Min:96.4 °F (35.8 °C), Max:98.5 °F (36.9 °C) Jake Romo I&O reviewed as documented. Physical Exam:  
 General-in no distress. Baseline confusion Abdomen- Wound clean, scant drainage under tegaderm and pt has mild appropriately distributed tenderness with hyperactive, normal bowel sounds Ext- leaking right arm IV. Labs: No results found for this or any previous visit (from the past 24 hour(s)). Assessment:  
 
Principal Problem: 
  SBO (small bowel obstruction) ([facility] [address] (2019) Plan/Recommendations/Medical Decision Making:  
 
Continue present treatment 
await return of GI function Roach out- concern for pt pulling it out despite restraints.

## 2019-04-07 NOTE — PROGRESS NOTES
Problem: Falls - Risk of 
Goal: *Absence of Falls Description Document Eugenio Rosario Fall Risk and appropriate interventions in the flowsheet. 4/7/2019 1739 by Conor Norris RN Outcome: Progressing Towards Goal

## 2019-04-07 NOTE — PROGRESS NOTES
Am assessment completed. Pt is alert to self. dominique wrist restraints and mitts to prevent pulling tube out. Respirations even and non-labored. Continue to monitor.

## 2019-04-08 PROCEDURE — 77030020253 HC SOL INJ D545NS .05 DEX .45 SAL

## 2019-04-08 PROCEDURE — 86580 TB INTRADERMAL TEST: CPT | Performed by: SURGERY

## 2019-04-08 PROCEDURE — 74011250637 HC RX REV CODE- 250/637: Performed by: SURGERY

## 2019-04-08 PROCEDURE — 65270000029 HC RM PRIVATE

## 2019-04-08 PROCEDURE — 74011000302 HC RX REV CODE- 302: Performed by: SURGERY

## 2019-04-08 PROCEDURE — 74011000258 HC RX REV CODE- 258: Performed by: SURGERY

## 2019-04-08 PROCEDURE — 74011250636 HC RX REV CODE- 250/636: Performed by: SURGERY

## 2019-04-08 RX ORDER — ACETAMINOPHEN 325 MG/1
650 TABLET ORAL
Status: DISCONTINUED | OUTPATIENT
Start: 2019-04-08 | End: 2019-04-11 | Stop reason: HOSPADM

## 2019-04-08 RX ADMIN — Medication 10 ML: at 23:54

## 2019-04-08 RX ADMIN — DEXTROSE MONOHYDRATE AND SODIUM CHLORIDE 100 ML/HR: 5; .45 INJECTION, SOLUTION INTRAVENOUS at 02:13

## 2019-04-08 RX ADMIN — HYDROMORPHONE HYDROCHLORIDE 0.5 MG: 2 INJECTION, SOLUTION INTRAMUSCULAR; INTRAVENOUS; SUBCUTANEOUS at 03:10

## 2019-04-08 RX ADMIN — Medication 10 ML: at 06:12

## 2019-04-08 RX ADMIN — DEXTROSE MONOHYDRATE AND SODIUM CHLORIDE 100 ML/HR: 5; .45 INJECTION, SOLUTION INTRAVENOUS at 23:00

## 2019-04-08 RX ADMIN — HYDROMORPHONE HYDROCHLORIDE 0.5 MG: 2 INJECTION, SOLUTION INTRAMUSCULAR; INTRAVENOUS; SUBCUTANEOUS at 19:50

## 2019-04-08 RX ADMIN — ACETAMINOPHEN 650 MG: 325 TABLET, FILM COATED ORAL at 16:05

## 2019-04-08 RX ADMIN — TUBERCULIN PURIFIED PROTEIN DERIVATIVE 5 UNITS: 5 INJECTION, SOLUTION INTRADERMAL at 16:21

## 2019-04-08 NOTE — PROGRESS NOTES
Problem: Non-Violent Restraints Goal: *Removal from restraints as soon as assessed to be safe 4/8/2019 1528 by Indira Barragan, RN Outcome: Resolved/Met 
4/8/2019 0741 by Indira Barragan, RN Outcome: Progressing Towards Goal

## 2019-04-08 NOTE — PROGRESS NOTES
Am assessment completed. Alert to self. Family at bedside. Denies pain. Midline incision with stables c/d/i. Continue to monitor.

## 2019-04-08 NOTE — PROGRESS NOTES
Problem: Falls - Risk of 
Goal: *Absence of Falls Description Document Brigida Harmon Fall Risk and appropriate interventions in the flowsheet.  
Outcome: Progressing Towards Goal

## 2019-04-08 NOTE — PROGRESS NOTES
Care Management Interventions PCP Verified by CM: Yes Mode of Transport at Discharge: Other (see comment) Transition of Care Consult (CM Consult): Other Current Support Network: Own Home, Lives with Caregiver, Lives with Spouse Confirm Follow Up Transport: Family Plan discussed with Pt/Family/Caregiver: Yes Freedom of Choice Offered: Yes Discharge Location Discharge Placement: Home Visited with pt regarding plans for discharge,pt lives at home with wife, he requires assistance to dress/bathe per wife due to his Dementia. She plans on taking him home at d/c and as of now we are going to continue current tx. PT/PPD orders placed. Per Dr. Luana Juares on 4/7 Dx: SBO Continue present treatment 
await return of GI function Roach out- concern for pt pulling it out despite restraints. 4/10 Care Management Interventions PCP Verified by CM: Yes Mode of Transport at Discharge: BLS Transition of Care Consult (CM Consult): SNF Current Support Network: Own Home, Lives with Spouse Confirm Follow Up Transport: Family Plan discussed with Pt/Family/Caregiver: Yes Freedom of Choice Offered: Yes Discharge Location Discharge Placement: Skilled nursing facility Spoke with wife and she agrees with PT  That pt would benefit from rehab, sent referral to [facility] 
 
@ [phone] is full, sent referral to [facility]

## 2019-04-08 NOTE — PROGRESS NOTES
Nutrition Assessment for: . LOS Day 5 screen Assessment:  
Diet order: DIET NPO With Ice Chips Day 5 Surgical H&P \"Pt presents nausea and vomiting with crampy abdominal pain. CT showed distal SBO. Previous colectomy with post op SBO. \"   
Pt is S/P day 2 exploratory laparotomy; lysis of adhesions; lysis of adhesive bands. Per MD progress note, no flatus reported yet; awaiting return of GI function. NGT placed to suction (pt has been pulling out). Pertinent Labs: Glucose 148 Pertinent Meds: Dextrose 5% 0.45% NaCl @ 100 ml/hr Anthropometrics: 
Height: 5' 10\" (1.778 m), Weight: 62.6 kg (138 lb), source not indicated, Body mass index is 19.8 kg/m². (Underweight BMI for pt >72years old;  BMI 23-30 preferred) Weight history in EMR: 
WT / BMI WEIGHT BODY MASS INDEX  
4/2/2019 138 lb 19.8 kg/m2  
12/28/2018 148 lb 21.24 kg/m2 11/13/2018 150 lb 21.52 kg/m2  
11/6/2018 155 lb 6.4 oz 22.3 kg/m2 9/20/2018 157 lb 12.8 oz 22.64 kg/m2 Malnutrition Criteria: 
Pt appears to have lost ~20# in the past six months. Macronutrient needs: EER: 1878- 2191kcal/day (30-35 kcal/kg) EPR: 69-75 g/day (1.1-1.2 g/kg BW) Intake/Comparative Standards: Pt is NPO. IV fluids providing 408 non-protein kcals. This is inadequate to meet estimated nutritional needs. Nutrition Diagnosis: 
Inadequate oral intake related to altered GI function as evidenced by SBO diagnosis with NPO diet order. Nutrition Intervention: 
Meals and snacks: NPO diet order; MD to advance when clinically indicated. Note: NPO/Clear liquid diet status is r/t SBO . It is reasonable to wait 7-10 days before initiating TPN in a patient with no or low nutrition risk. Will await start or progression of po diet as medical condition dictates. RD available for consult to manage TPN as needed. Coordination of Care: Order weight for pt for malnutrition risk assessment. Nutrition Discharge Needs/Plan: Too soon to determine. Ankush Guan [address], [address] [place] [address], [address], [address]

## 2019-04-08 NOTE — PROGRESS NOTES
Assisted pt up to restroom. He voided in the toilet. Pt is unsteady walking but able to do it with just one person assist.  He is pleasantly confused but did know he needed to use the toilet. Sitter and family at bedside.

## 2019-04-09 LAB
MM INDURATION POC: NORMAL MM (ref 0–5)
PPD POC: NORMAL NEGATIVE

## 2019-04-09 PROCEDURE — 97161 PT EVAL LOW COMPLEX 20 MIN: CPT

## 2019-04-09 PROCEDURE — 74011250636 HC RX REV CODE- 250/636: Performed by: SURGERY

## 2019-04-09 PROCEDURE — 65270000029 HC RM PRIVATE

## 2019-04-09 PROCEDURE — 77030020253 HC SOL INJ D545NS .05 DEX .45 SAL

## 2019-04-09 PROCEDURE — 74011000258 HC RX REV CODE- 258: Performed by: SURGERY

## 2019-04-09 PROCEDURE — 97165 OT EVAL LOW COMPLEX 30 MIN: CPT

## 2019-04-09 RX ADMIN — Medication 10 ML: at 22:37

## 2019-04-09 RX ADMIN — Medication 10 ML: at 05:34

## 2019-04-09 RX ADMIN — DEXTROSE MONOHYDRATE AND SODIUM CHLORIDE 100 ML/HR: 5; .45 INJECTION, SOLUTION INTRAVENOUS at 20:45

## 2019-04-09 RX ADMIN — HYDROMORPHONE HYDROCHLORIDE 0.5 MG: 2 INJECTION, SOLUTION INTRAMUSCULAR; INTRAVENOUS; SUBCUTANEOUS at 20:59

## 2019-04-09 NOTE — PROGRESS NOTES
Shift assessment completed via doc flow sheet. Pt A & O x 4. Lungs CTA, HR WNL, NSR. Abdomen soft and non tender. C/o abdominal pain. Pt restless and agitated. PRN Dilaudid 0.5mg IVP administered. IVS c/d/i, no s/sx of infection/infiltration noted. Pt able to make needs known. No concerns at this time. Sitter at bedside. Bed low and locked. Call light within reach. Will continue to monitor.

## 2019-04-09 NOTE — PROGRESS NOTES
Problem: Mobility Impaired (Adult and Pediatric) Goal: *Acute Goals and Plan of Care (Insert Text) Description STG: 
(1.)Mr. Dave will move from supine to sit and sit to supine  with STAND BY ASSIST within 4-7 treatment day(s). (2.)Mr. Dave will transfer from bed to chair and chair to bed with STAND BY ASSIST using the least restrictive device within 4-7 treatment day(s). (3.)Mr. Dave will ambulate with CONTACT GUARD ASSIST for 250 feet with the least restrictive device within 4-7 treatment day(s). ________________________________________________________________________________________________ Outcome: Progressing Towards Goal 
  
PHYSICAL THERAPY: Initial Assessment and AM 4/9/2019 INPATIENT: PT Visit Days : (day of assessment) [place] / Product Type: Medicare /   
  
NAME/AGE/GENDER: Tami Sylvester is a 82 y.o. male PRIMARY DIAGNOSIS: SBO (small bowel obstruction) ([facility] [address] [K56.609] SBO (small bowel obstruction) ([facility] [address] SBO (small bowel obstruction) ([facility] [address] Procedure(s) (LRB): 
LAPAROTOMY EXPLORATORY , LYSIS OF ADHESIONS AND LYSIS OF ADHESIVE BAND (N/A) 4 Days Post-Op ICD-10: Treatment Diagnosis:  
 Generalized Muscle Weakness (M62.81) Difficulty in walking, Not elsewhere classified (R26.2) Precaution/Allergies: 
Shellfish containing products ASSESSMENT:  
 
Mr. Roslyn Collins presents supine on contact with mitts on hand in place. He is pleasantly confused and oriented to self. He was agreeable and eager to get up and walk. He moved from supine to sit with contact guard assist and sit to stand with minimal assist. Walked in real with minimal assist of one and another to push the IV pole. He is very unsteady on his feet and at increased risk for falls. Would not be safe to be up without assist. Scissoring gait at times, shuffling.  He will benefit from skilled PT interventions to maximize independence with functional mobility. Hope to progress at next treatment session. He was left in bed with mitts back on and bed alarm on. This section established at most recent assessment PROBLEM LIST (Impairments causing functional limitations): 
Decreased Strength Decreased ADL/Functional Activities Decreased Transfer Abilities Decreased Ambulation Ability/Technique Decreased Balance INTERVENTIONS PLANNED: (Benefits and precautions of physical therapy have been discussed with the patient.) Balance Exercise Bed Mobility Gait Training Therapeutic Activites Therapeutic Exercise/Strengthening Transfer Training TREATMENT PLAN: Frequency/Duration: daily for duration of hospital stay Rehabilitation Potential For Stated Goals: Good RECOMMENDED REHABILITATION/EQUIPMENT: (at time of discharge pending progress): Due to the probability of continued deficits (see above) this patient will likely need continued skilled physical therapy after discharge. Equipment: To be determined HISTORY:  
History of Present Injury/Illness (Reason for Referral): PER MD NOTE: Ellen Ortega is a 82 y.o. male who presents nausea and vomiting with crampy abdominal pain. CT showed distal SBO. Previous colectomy with post op SBO. Demented Wife present. Denies any pain. Past Medical History/Comorbidities:  
Mr. Mely Webb  has a past medical history of Anemia, Colon cancer ([facility] [address] Dementia, Full dentures, History of chemotherapy, History of prostate cancer, Hypothyroidism, and Vitamin D deficiency. Mr. Mely Webb  has a past surgical history that includes hx hernia repair; hx colonoscopy; hx endoscopy; hx colectomy; and colonoscopy (N/A, 5/15/2017). Social History/Living Environment:  
Home Environment: Private residence One/Two Story Residence: One story Living Alone: No 
Support Systems: Family member(s), Spouse/Significant Other/Partner Patient Expects to be Discharged to[de-identified] Private residence Current DME Used/Available at Home: None Prior Level of Function/Work/Activity: 
Pt living at home with wife, requires assist for ADLs due to his dementia Number of Personal Factors/Comorbidities that affect the Plan of Care: 0: LOW COMPLEXITY EXAMINATION:  
Most Recent Physical Functioning:  
Gross Assessment: 
AROM: Generally decreased, functional 
Strength: Generally decreased, functional 
         
  
Posture: 
Posture (WDL): Within defined limits Balance: 
Sitting: Intact Standing: Impaired; With support Standing - Static: Fair Standing - Dynamic : Fair Bed Mobility: 
Supine to Sit: Contact guard assistance Sit to Supine: Contact guard assistance Wheelchair Mobility: 
  
Transfers: 
Sit to Stand: Contact guard assistance;Minimum assistance Stand to Sit: Contact guard assistance;Minimum assistance Gait: 
  
Base of Support: Center of gravity altered;Narrowed Speed/Alexandra: Fluctuations; Shuffled Step Length: Left shortened;Right shortened Gait Abnormalities: Decreased step clearance;Scissoring;Shuffling gait;Trunk sway increased Distance (ft): 100 Feet (ft) Assistive Device: (hand held assist) Ambulation - Level of Assistance: Minimal assistance Body Structures Involved: Muscles Body Functions Affected: Movement Related Activities and Participation Affected: Mobility Self Care Number of elements that affect the Plan of Care: 4+: HIGH COMPLEXITY CLINICAL PRESENTATION:  
Presentation: Stable and uncomplicated: LOW COMPLEXITY CLINICAL DECISION MAKING:  
[employer] AM-PAC? ?6 Clicks? Basic Mobility Inpatient Short Form How much difficulty does the patient currently have. .. Unable A Lot A Little None 1. Turning over in bed (including adjusting bedclothes, sheets and blankets)? ? 1   ? 2   ? 3   ? 4  
2.   Sitting down on and standing up from a chair with arms ( e.g., wheelchair, bedside commode, etc.)   ? 1   ? 2   ? 3   ? 4  
3. Moving from lying on back to sitting on the side of the bed?   ? 1   ? 2   ? 3   ? 4 How much help from another person does the patient currently need. .. Total A Lot A Little None 4. Moving to and from a bed to a chair (including a wheelchair)? ? 1   ? 2   ? 3   ? 4  
5. Need to walk in hospital room? ? 1   ? 2   ? 3   ? 4  
6. Climbing 3-5 steps with a railing? ? 1   ? 2   ? 3   ? 4  
© 2007, Trustees of [address] [place] [address] [address] [address], under license to [employer]. All rights reserved Score:  Initial: 17 Most Recent: X (Date: -- ) Interpretation of Tool:  Represents activities that are increasingly more difficult (i.e. Bed mobility, Transfers, Gait). Medical Necessity:    
Patient is expected to demonstrate progress in strength, coordination and functional technique 
 to decrease assistance required with functional mobility Markel Kraft Reason for Services/Other Comments: 
Patient continues to require skilled intervention due to inability to complete functional mobility independently Markel Kraft Use of outcome tool(s) and clinical judgement create a POC that gives a: Clear prediction of patient's progress: LOW COMPLEXITY  
  
 
 
 
TREATMENT:  
(In addition to Assessment/Re-Assessment sessions the following treatments were rendered) Pre-treatment Symptoms/Complaints:  none Pain: Initial: No reports of pain Post Session:  no reports of pain Assessment/Reassessment only, no treatment provided today Braces/Orthotics/Lines/Etc:  
O2 Device: Room air Treatment/Session Assessment:   
Response to Treatment:  appeared to tolerate well Interdisciplinary Collaboration:  
Registered Nurse After treatment position/precautions:  
Supine in bed Bed alarm/tab alert on Bed/Chair-wheels locked Bed in low position Call light within reach Compliance with Program/Exercises: Will assess as treatment progresses Recommendations/Intent for next treatment session: \"Next visit will focus on advancements to more challenging activities and reduction in assistance provided\". Total Treatment Duration: PT Patient Time In/Time Out Time In: 6662 Time Out: 1111 Kayli Costello, PT

## 2019-04-09 NOTE — PROGRESS NOTES
OCCUPATIONAL THERAPY: Initial Assessment and Discharge 4/9/2019 INPATIENT:   
[place] / Product Type: Medicare /  
  
NAME/AGE/GENDER: Martin Thompson is a 82 y.o. male PRIMARY DIAGNOSIS:  SBO (small bowel obstruction) ([facility] [address] [K56.609] SBO (small bowel obstruction) ([address] SBO (small bowel obstruction) ([address] Procedure(s) (LRB): 
LAPAROTOMY EXPLORATORY , LYSIS OF ADHESIONS AND LYSIS OF ADHESIVE BAND (N/A) 4 Days Post-Op ICD-10: Treatment Diagnosis:  
 Generalized Muscle Weakness (M62.81) Other lack of cordination (R27.8) Moderate to severe confusion Precautions/Allergies: 
   Shellfish containing products ASSESSMENT:  
Mr. Omega Monroy presents with above diagnosis. Pt was seen in room with wife present. Pt was pleasant but oriented to self only and able to follow commands but with several verbal, visual and tactile cues. Wife states that pt's requires assistance with all self care due to his dementia and that this has been his baseline. NO further OT warranted. OT left note for  to assist wife with services. OCCUPATIONAL PROFILE AND HISTORY:  
History of Present Injury/Illness (Reason for Referral): Confusion Past Medical History/Comorbidities:  
Mr. Omega Monroy  has a past medical history of Anemia, Colon cancer ([facility] [address] Dementia, Full dentures, History of chemotherapy, History of prostate cancer, Hypothyroidism, and Vitamin D deficiency. Mr. Omega Monroy  has a past surgical history that includes hx hernia repair; hx colonoscopy; hx endoscopy; hx colectomy; and colonoscopy (N/A, 5/15/2017). Social History/Living Environment:  
Home Environment: Private residence One/Two Story Residence: One story Living Alone: No 
Support Systems: Family member(s), Spouse/Significant Other/Partner Patient Expects to be Discharged to[de-identified] Private residence Current DME Used/Available at Home: None Number of Personal Factors/Comorbidities that affect the Plan of Care: Brief history (0):  LOW COMPLEXITY ASSESSMENT OF OCCUPATIONAL PERFORMANCE[de-identified]  
Activities of Daily Living:  
Basic ADLs (From Assessment) Complex ADLs (From Assessment) Oral Facial Hygiene/Grooming: Total assistance Bathing: Total assistance Upper Body Dressing: Total assistance Lower Body Dressing: Total assistance Toileting: Moderate assistance, Minimum assistance Grooming/Bathing/Dressing Activities of Daily Living Cognitive Retraining Safety/Judgement: Awareness of environment Functional Transfers Bathroom Mobility: Minimum assistance Toilet Transfer : Minimum assistance Shower Transfer: Minimum assistance Bed/Mat Mobility Supine to Sit: Contact guard assistance Sit to Supine: Contact guard assistance Sit to Stand: Contact guard assistance;Minimum assistance Stand to Sit: Contact guard assistance;Minimum assistance Most Recent Physical Functioning:  
Gross Assessment: 
  
         
  
Posture: 
Posture (WDL): Within defined limits Balance: 
Sitting: Intact Standing: Impaired; With support Standing - Static: Fair Standing - Dynamic : Fair Bed Mobility: 
Supine to Sit: Contact guard assistance Sit to Supine: Contact guard assistance Wheelchair Mobility: 
  
Transfers: 
Sit to Stand: Contact guard assistance;Minimum assistance Stand to Sit: Contact guard assistance;Minimum assistance Patient Vitals for the past 6 hrs: 
 BP BP Patient Position SpO2 Pulse 04/09/19 1134 (!) 166/95 At rest;Supine 97 % 93 Mental Status Neurologic State: Alert, Confused Orientation Level: Oriented to person Cognition: Follows commands(with multiple verbal cues) Perception: Appears intact Perseveration: No perseveration noted Safety/Judgement: Awareness of environment Physical Skills Involved: 
Balance Cognitive Skills Affected (resulting in the inability to perform in a timely and safe manner): Executive Function Immediate Memory Short Term Recall Sustained Attention Divided Attention Comprehension Psychosocial Skills Affected: 
Environmental Adaptation Number of elements that affect the Plan of Care: 5+:  HIGH COMPLEXITY CLINICAL DECISION MAKIN[address] [place] [address] AM-PAC? ?6 Clicks? Daily Activity Inpatient Short Form How much help from another person does the patient currently need. .. Total A Lot A Little None 1. Putting on and taking off regular lower body clothing? ? 1   ? 2   ? 3   ? 4  
2. Bathing (including washing, rinsing, drying)? ? 1   ? 2   ? 3   ? 4  
3. Toileting, which includes using toilet, bedpan or urinal?   ? 1   ? 2   ? 3   ? 4  
4. Putting on and taking off regular upper body clothing? ? 1   ? 2   ? 3   ? 4  
5. Taking care of personal grooming such as brushing teeth? ? 1   ? 2   ? 3   ? 4  
6. Eating meals? ? 1   ? 2   ? 3   ? 4  
© , Trustees of [address] [place] [address], under license to [employer]. All rights reserved Score:  Initial: 8 Most Recent: X (Date: -- ) Interpretation of Tool:  Represents activities that are increasingly more difficult (i.e. Bed mobility, Transfers, Gait). Use of outcome tool(s) and clinical judgement create a POC that gives a: LOW COMPLEXITY  
 
 
 
TREATMENT:  
(In addition to Assessment/Re-Assessment sessions the following treatments were rendered) Pre-treatment Symptoms/Complaints:   
Pain: Initial:  
Pain Intensity 1: 0 0 Post Session:  0 Assessment/Reassessment only, no treatment provided today Braces/Orthotics/Lines/Etc:  
IV 
O2 Device: Room air Treatment/Session Assessment:   
Response to Treatment:  pt tolerates activity well Interdisciplinary Collaboration:  
Physical Therapist 
Occupational Therapist 
Registered Nurse  After treatment position/precautions:  
Supine in bed Bed alarm/tab alert on Bed/Chair-wheels locked Bed in low position Call light within reach RN notified Family at bedside Compliance with Program/Exercises: Compliant most of the time. Total Treatment Duration: OT Patient Time In/Time Out Time In: 0507 Time Out: 1610 Mary Camacho OT

## 2019-04-09 NOTE — PROGRESS NOTES
Am assessment completed. Pt is alert to self. Lungs clear. Breathing non-labored. Incision to abd midline with staples. Small amount of serosang drainage. Changed dsg. Working with therapy today. Pleasant and cooperative. Continue to monitor.

## 2019-04-10 LAB
MM INDURATION POC: 0 MM (ref 0–5)
PPD POC: NEGATIVE NEGATIVE

## 2019-04-10 PROCEDURE — 74011250636 HC RX REV CODE- 250/636: Performed by: PHYSICIAN ASSISTANT

## 2019-04-10 PROCEDURE — 65270000029 HC RM PRIVATE

## 2019-04-10 PROCEDURE — 74011250637 HC RX REV CODE- 250/637: Performed by: PHYSICIAN ASSISTANT

## 2019-04-10 PROCEDURE — 97530 THERAPEUTIC ACTIVITIES: CPT

## 2019-04-10 RX ORDER — LEVOTHYROXINE SODIUM 50 UG/1
50 TABLET ORAL
Status: DISCONTINUED | OUTPATIENT
Start: 2019-04-10 | End: 2019-04-11 | Stop reason: HOSPADM

## 2019-04-10 RX ORDER — HYDRALAZINE HYDROCHLORIDE 10 MG/1
10 TABLET, FILM COATED ORAL
Status: DISCONTINUED | OUTPATIENT
Start: 2019-04-10 | End: 2019-04-11 | Stop reason: HOSPADM

## 2019-04-10 RX ORDER — TRAZODONE HYDROCHLORIDE 50 MG/1
75 TABLET ORAL
Status: DISCONTINUED | OUTPATIENT
Start: 2019-04-10 | End: 2019-04-11 | Stop reason: HOSPADM

## 2019-04-10 RX ORDER — HEPARIN SODIUM 5000 [USP'U]/ML
5000 INJECTION, SOLUTION INTRAVENOUS; SUBCUTANEOUS EVERY 8 HOURS
Status: DISCONTINUED | OUTPATIENT
Start: 2019-04-10 | End: 2019-04-11 | Stop reason: HOSPADM

## 2019-04-10 RX ORDER — DIVALPROEX SODIUM 125 MG/1
125 CAPSULE, COATED PELLETS ORAL EVERY 12 HOURS
Status: DISCONTINUED | OUTPATIENT
Start: 2019-04-10 | End: 2019-04-11 | Stop reason: HOSPADM

## 2019-04-10 RX ORDER — PANTOPRAZOLE SODIUM 40 MG/1
40 TABLET, DELAYED RELEASE ORAL
Status: DISCONTINUED | OUTPATIENT
Start: 2019-04-10 | End: 2019-04-11 | Stop reason: HOSPADM

## 2019-04-10 RX ADMIN — Medication 10 ML: at 06:00

## 2019-04-10 RX ADMIN — PANTOPRAZOLE SODIUM 40 MG: 40 TABLET, DELAYED RELEASE ORAL at 08:56

## 2019-04-10 RX ADMIN — HEPARIN SODIUM 5000 UNITS: 5000 INJECTION INTRAVENOUS; SUBCUTANEOUS at 08:56

## 2019-04-10 RX ADMIN — TRAZODONE HYDROCHLORIDE 75 MG: 50 TABLET ORAL at 20:43

## 2019-04-10 RX ADMIN — Medication 10 ML: at 20:48

## 2019-04-10 RX ADMIN — HEPARIN SODIUM 5000 UNITS: 5000 INJECTION INTRAVENOUS; SUBCUTANEOUS at 16:42

## 2019-04-10 RX ADMIN — DIVALPROEX SODIUM 125 MG: 125 CAPSULE, COATED PELLETS ORAL at 20:44

## 2019-04-10 RX ADMIN — HYDRALAZINE HYDROCHLORIDE 10 MG: 10 TABLET, FILM COATED ORAL at 16:42

## 2019-04-10 RX ADMIN — LEVOTHYROXINE SODIUM 50 MCG: 50 TABLET ORAL at 08:56

## 2019-04-10 NOTE — PROGRESS NOTES
Problem: Mobility Impaired (Adult and Pediatric) Goal: *Acute Goals and Plan of Care (Insert Text) Description STG: 
(1.)Mr. Dave will move from supine to sit and sit to supine  with STAND BY ASSIST within 4-7 treatment day(s). (2.)Mr. Dave will transfer from bed to chair and chair to bed with STAND BY ASSIST using the least restrictive device within 4-7 treatment day(s). (3.)Mr. Dave will ambulate with CONTACT GUARD ASSIST for 250 feet with the least restrictive device within 4-7 treatment day(s). ________________________________________________________________________________________________ Outcome: Progressing Towards Goal 
  
PHYSICAL THERAPY: Daily Note and AM 4/10/2019 INPATIENT: PT Visit Days : (day of assessment) [place] / Product Type: Medicare /   
  
NAME/AGE/GENDER: Amna Sosa is a 82 y.o. male PRIMARY DIAGNOSIS: SBO (small bowel obstruction) ([facility] [address] [K56.609] SBO (small bowel obstruction) ([facility] [address] SBO (small bowel obstruction) ([facility] [address] Procedure(s) (LRB): 
LAPAROTOMY EXPLORATORY , LYSIS OF ADHESIONS AND LYSIS OF ADHESIVE BAND (N/A) 5 Days Post-Op ICD-10: Treatment Diagnosis:  
 · Generalized Muscle Weakness (M62.81) · Difficulty in walking, Not elsewhere classified (R26.2) Precaution/Allergies: 
Shellfish containing products ASSESSMENT:  
Mr. Stew Pradhan is supine in bed on arrival. He is agreeable to PT and plans to go rehab at discharge. Bed mobility with SBA. Pt ambulated in the hallway with HHA and unsteady gait. He returned to room and positioned in bed with wife present, needs in reach, and bed alarm on. This section established at most recent assessment PROBLEM LIST (Impairments causing functional limitations): 1. Decreased Strength 2. Decreased ADL/Functional Activities 3. Decreased Transfer Abilities 4. Decreased Ambulation Ability/Technique 5. Decreased Balance INTERVENTIONS PLANNED: (Benefits and precautions of physical therapy have been discussed with the patient.) 1. Balance Exercise 2. Bed Mobility 3. Gait Training 4. Therapeutic Activites 5. Therapeutic Exercise/Strengthening 6. Transfer Training TREATMENT PLAN: Frequency/Duration: daily for duration of hospital stay Rehabilitation Potential For Stated Goals: Good RECOMMENDED REHABILITATION/EQUIPMENT: (at time of discharge pending progress): Due to the probability of continued deficits (see above) this patient will likely need continued skilled physical therapy after discharge. Equipment: ? To be determined HISTORY:  
History of Present Injury/Illness (Reason for Referral): PER MD NOTE: Martin Thompson is a 82 y.o. male who presents nausea and vomiting with crampy abdominal pain. CT showed distal SBO. Previous colectomy with post op SBO. Demented Wife present. Denies any pain. Past Medical History/Comorbidities:  
Mr. Omega Monroy  has a past medical history of Anemia, Colon cancer ([facility] [address] Dementia, Full dentures, History of chemotherapy, History of prostate cancer, Hypothyroidism, and Vitamin D deficiency. Mr. Omega Monroy  has a past surgical history that includes hx hernia repair; hx colonoscopy; hx endoscopy; hx colectomy; and colonoscopy (N/A, 5/15/2017). Social History/Living Environment:  
Home Environment: Private residence One/Two Story Residence: One story Living Alone: No 
Support Systems: Family member(s), Spouse/Significant Other/Partner Patient Expects to be Discharged to[de-identified] Private residence Current DME Used/Available at Home: None Prior Level of Function/Work/Activity: 
Pt living at home with wife, requires assist for ADLs due to his dementia Number of Personal Factors/Comorbidities that affect the Plan of Care: 0: LOW COMPLEXITY EXAMINATION:  
Most Recent Physical Functioning:  
Gross Assessment: 
  
         
  
Posture: 
  
Balance: 
  Bed Mobility: Supine to Sit: Stand-by assistance Sit to Supine: Stand-by assistance Wheelchair Mobility: 
  
Transfers: 
Sit to Stand: Stand-by assistance Stand to Sit: Stand-by assistance Gait: 
  
Base of Support: Center of gravity altered Speed/Alexandra: Shuffled Step Length: Left shortened;Right shortened Gait Abnormalities: Decreased step clearance Distance (ft): 200 Feet (ft) Assistive Device: (HHA) Ambulation - Level of Assistance: Minimal assistance Body Structures Involved: 1. Muscles Body Functions Affected: 1. Movement Related Activities and Participation Affected: 1. Mobility 2. Self Care Number of elements that affect the Plan of Care: 4+: HIGH COMPLEXITY CLINICAL PRESENTATION:  
Presentation: Stable and uncomplicated: LOW COMPLEXITY CLINICAL DECISION MAKING:  
[facility] AM-PAC 6 Clicks Basic Mobility Inpatient Short Form How much difficulty does the patient currently have. .. Unable A Lot A Little None 1. Turning over in bed (including adjusting bedclothes, sheets and blankets)? ? 1   ? 2   ? 3   ? 4  
2. Sitting down on and standing up from a chair with arms ( e.g., wheelchair, bedside commode, etc.)   ? 1   ? 2   ? 3   ? 4  
3. Moving from lying on back to sitting on the side of the bed?   ? 1   ? 2   ? 3   ? 4 How much help from another person does the patient currently need. .. Total A Lot A Little None 4. Moving to and from a bed to a chair (including a wheelchair)? ? 1   ? 2   ? 3   ? 4  
5. Need to walk in hospital room? ? 1   ? 2   ? 3   ? 4  
6. Climbing 3-5 steps with a railing? ? 1   ? 2   ? 3   ? 4  
© 2007, Trustees of [facility], under license to [employer]. All rights reserved Score:  Initial: 17 Most Recent: X (Date: -- ) Interpretation of Tool:  Represents activities that are increasingly more difficult (i.e. Bed mobility, Transfers, Gait). Medical Necessity: · Patient is expected to demonstrate progress in strength, coordination and functional technique ·  to decrease assistance required with functional mobility · . Reason for Services/Other Comments: 
· Patient continues to require skilled intervention due to inability to complete functional mobility independently · . Use of outcome tool(s) and clinical judgement create a POC that gives a: Clear prediction of patient's progress: LOW COMPLEXITY  
  
 
 
 
TREATMENT:  
(In addition to Assessment/Re-Assessment sessions the following treatments were rendered) Pre-treatment Symptoms/Complaints:  none Pain: Initial: No reports of pain Post Session:  no reports of pain Therapeutic Activity: (    25 minutes): Therapeutic activities including Ambulation on level ground to improve mobility, strength and balance. Required minimal   to promote dynamic balance in standing. Braces/Orthotics/Lines/Etc:  
· O2 Device: Room air Treatment/Session Assessment:   
· Response to Treatment:  Tolerated treatment well. · Interdisciplinary Collaboration:  
o Registered Nurse · After treatment position/precautions:  
o Supine in bed 
o Bed alarm/tab alert on 
o Bed/Chair-wheels locked 
o Bed in low position 
o Caregiver at bedside 
o Call light within reach · Compliance with Program/Exercises: Will assess as treatment progresses · Recommendations/Intent for next treatment session: \"Next visit will focus on advancements to more challenging activities and reduction in assistance provided\". Total Treatment Duration: PT Patient Time In/Time Out Time In: 1150 Time Out: 4108 Gabrielle Roberts PTA

## 2019-04-10 NOTE — PROGRESS NOTES
Problem: Non-Violent Restraints Goal: *No harm/injury to patient while restraints in use Outcome: Resolved/Met Goal: *Patient's dignity will be maintained Outcome: Resolved/Met Goal: *Patient Specific Goal (EDIT GOAL, INSERT TEXT) Outcome: Resolved/Met Goal: Non-violent Restaints:Standard Interventions Outcome: Resolved/Met Goal: Non-violent Restraints:Patient Interventions Outcome: Resolved/Met Goal: Patient/Family Education Outcome: Resolved/Met Problem: Falls - Risk of 
Goal: *Absence of Falls Description Document Avila Miles Fall Risk and appropriate interventions in the flowsheet. Outcome: Progressing Towards Goal 
  
Problem: Patient Education: Go to Patient Education Activity Goal: Patient/Family Education Outcome: Progressing Towards Goal 
  
Problem: Pressure Injury - Risk of 
Goal: *Prevention of pressure injury Description Document Nilo Scale and appropriate interventions in the flowsheet. Outcome: Progressing Towards Goal 
  
Problem: Patient Education: Go to Patient Education Activity Goal: Patient/Family Education Outcome: Progressing Towards Goal 
  
Problem: Nutrition Deficit Goal: *Optimize nutritional status Outcome: Progressing Towards Goal 
  
Problem: Patient Education: Go to Patient Education Activity Goal: Patient/Family Education Outcome: Progressing Towards Goal

## 2019-04-10 NOTE — PROGRESS NOTES
Shift assessment completed via doc flow sheet. Pt A & O to person. Lungs CTA, HR WNL, NSR. Abdomen soft and non tender. Midline c/d/i, no s/sx of infection/infiltration noted. Pt able to make needs known. No concerns at this time. Bed low and locked. Bed alarm in place. Sitter at bedside. Call light within reach. Will continue to monitor.

## 2019-04-10 NOTE — PROGRESS NOTES
Midline assessed for patency. Flushed 30 ml of saline in without difficulty, but no blood return which is common for a ML. Primary RN notified.

## 2019-04-10 NOTE — PROGRESS NOTES
Shaina rec call from Akash Nolen [facility] liaison who informed [facility] will have a bed available on Friday. Sw to cont to follow and assist. Julius Bryant

## 2019-04-10 NOTE — PROGRESS NOTES
A.M assessment complete; pt in bed. Alert & oriented to name &  only. Resp even & unlabored on room air; lungs clear. HR regular. Abdomen soft with active bowel sounds. No c/o pain. Midline dressing c/d/i. Pt able to follow some commands. Bed low & locked with alarm audible; call light in reach; no needs voiced.

## 2019-04-10 NOTE — PROGRESS NOTES
Pt's midline no longing working. PICC team called; they will come out this afternoon as soon as they can to see what can be done to find a line for the pt. Pt is a very hard stick. Pt is tolerating po liquids & producing urine at this time.

## 2019-04-10 NOTE — PROGRESS NOTES
Just heard from PICC team; they asked if someone could try a peripheral line before they make the trip to [place]. House supervisor Kae Chauhan to come & try a peripheral line as soon as she can. Pt is a very hard stick which is why the midline was placed last week.

## 2019-04-10 NOTE — PROGRESS NOTES
Spoke with pt's wife & asked her to bring in all the current bottles of the medication the pt was taking at home.

## 2019-04-10 NOTE — PROGRESS NOTES
Nutrition Follow Up: 
Initial Nutrition Assessment for: . LOS Day 5 screen Assessment:  
Diet order: Full Liquid 4/10. Surgical H&P \"Pt presents with nausea and vomiting; crampy abdominal pain. CT showed distal SBO. Previous colectomy with post op SBO. \"   
Pt is S/P day 5 exploratory laparotomy; diet advanced from clear liquids on 4/09 to full liquids today; pt with confusion; remembers eating ice cream for lunch today. Most recent documented BM: 4/10-liquid smear Pertinent Meds: Dextrose 5% 0.45% NaCl @ 100 ml/hr Anthropometrics: 
Height: 5' 10\" (1.778 m), Weight: 62.6 kg (138 lb), source not indicated, Body mass index is 19.8 kg/m². (Underweight BMI for pt >72years old;  BMI 23-30 preferred) Weight:  4/08:  61.7 kg-bed source Weight history in EMR:  Unable to determine if weights are actual vs stated due to functionality of [facility]. WT / BMI WEIGHT BODY MASS INDEX  
4/2/2019 138 lb 19.8 kg/m2  
12/28/2018 148 lb 21.24 kg/m2 11/13/2018 150 lb 21.52 kg/m2  
11/6/2018 155 lb 6.4 oz 22.3 kg/m2 9/20/2018 157 lb 12.8 oz 22.64 kg/m2 Pt meets ASPEN MALNUTRITION CRITERIA for Chronic Illness; moderate malnutrition r/t weight loss of 13% past 6 months and probable intake of < 75% estimated needs past month. Macronutrient needs: EER: 1878- 2191kcal/day (30-35 kcal/kg) EPR: 69-75 g/day (1.1-1.2 g/kg BW) Intake/Comparative Standards: 2 recorded intake with average intake of 38% ( 1 meal of clear liquid and 1 full liquid). Per RN, ptatient ate his ice cream at lunch and few sips of soup. Pt potentially meets < 20% estimated kcal and protein needs. IV fluids providing 408 non-protein kcals/day. Nutrition Diagnosis: 
Inadequate oral intake related to estimates of insufficient intake of energy or high quality protein from diet when compared to requirements.  
Nutrition Intervention: 
Meals and snacks: Continue Full Liquid diet; advance per MD recommendation as medical condition dictates. Nutrition Supplements:  Ensure Enlive on all meal trays. Gave patient an Ensure Enlive to sip on while waiting on supper meal tray. Coordination of Care: Haley Becerril RN Nutrition Discharge Needs/Plan: Too soon to determine. Onelia Garcia, MPH, [address] [place] [address],  
[phone]

## 2019-04-10 NOTE — PROGRESS NOTES
PLAN: 
Restart PTA meds PO PRN Hydralazine for HTN Dressing changes PRN to midline dressing; GRACY if not draining Advance to FLD 
PT/OOB Prophylaxis with SQ Heparin, Protonix, SCDs, IS. CM consult for discharge planning. Anticipate discharge in 1-2 days. ASSESSMENT:  Admit Date: 4/2/2019  
5 Days Post-Op  Procedure(s): LAPAROTOMY EXPLORATORY , LYSIS OF ADHESIONS AND LYSIS OF ADHESIVE BAND Principal Problem: 
  SBO (small bowel obstruction) ([address] (4/2/2019) SUBJECTIVE: 
Pt pleasantly confused which is his baseline. Denies N/V.  + BMs. Tmax 99.7, HTN, on room air. OBJECTIVE: 
Constitutional: Alert, confused, cooperative patient in no acute distress; appears stated age Visit Vitals /90 (BP 1 Location: Right arm, BP Patient Position: At rest) Pulse 85 Temp 99.6 °F (37.6 °C) Resp 14 Ht 5' 10\" (1.778 m) Wt 136 lb (61.7 kg) SpO2 93% BMI 19.51 kg/m² Eyes:Sclera are clear. ENMT: no external lesions gross hearing normal; no obvious neck masses, no ear or lip lesions CV: RRR. Normal perfusion Resp: No JVD. Breathing is  non-labored; no audible wheezing. CTAB 
GI: soft and non-distended; dressing with small amount of serosanguinous drainage; staples intact and incision healing appropriately; + BS. Musculoskeletal: unremarkable with normal function. No embolic signs or cyanosis. Neuro:  Confused; moves all 4; no focal deficits Psychiatric: normal affect and mood, +memory impairment Patient Vitals for the past 24 hrs: 
 BP Temp Pulse Resp SpO2  
04/10/19 0351 142/90 99.6 °F (37.6 °C) 85 14 93 % 04/09/19 2354 176/84 99.7 °F (37.6 °C) 96 14 90 % 04/09/19 1946 (!) 174/103 97.2 °F (36.2 °C) 87 18 93 % 04/09/19 1710 (!) 153/94 99 °F (37.2 °C) 83 18 98 % 04/09/19 1134 (!) 166/95 99.3 °F (37.4 °C) 93 18 97 % 04/09/19 0837 (!) 158/91 99 °F (37.2 °C) 86 16 93 % Labs:  No results for input(s): WBC, HGB, PLT, NA, K, CL, CO2, BUN, CREA, GLU, PTP, INR, APTT, TBIL, TBILI, CBIL, SGOT, GPT, ALT, AP, AML, LPSE, LCAD, NH4, TROPT, TROIQ, HGBEXT, PLTEXT in the last 72 hours. No lab exists for component: INREXT Nevelyn Gitelman, PA

## 2019-04-11 VITALS
RESPIRATION RATE: 18 BRPM | TEMPERATURE: 97.1 F | DIASTOLIC BLOOD PRESSURE: 74 MMHG | WEIGHT: 136 LBS | HEART RATE: 70 BPM | BODY MASS INDEX: 19.47 KG/M2 | HEIGHT: 70 IN | SYSTOLIC BLOOD PRESSURE: 146 MMHG | OXYGEN SATURATION: 95 %

## 2019-04-11 LAB
MM INDURATION POC: 0 MM (ref 0–5)
PPD POC: NEGATIVE NEGATIVE

## 2019-04-11 PROCEDURE — 74011250637 HC RX REV CODE- 250/637: Performed by: PHYSICIAN ASSISTANT

## 2019-04-11 PROCEDURE — 74011250636 HC RX REV CODE- 250/636: Performed by: PHYSICIAN ASSISTANT

## 2019-04-11 PROCEDURE — 97530 THERAPEUTIC ACTIVITIES: CPT

## 2019-04-11 RX ORDER — ACETAMINOPHEN 325 MG/1
650 TABLET ORAL
Status: SHIPPED | COMMUNITY
Start: 2019-04-11 | End: 2019-08-12

## 2019-04-11 RX ADMIN — HYDRALAZINE HYDROCHLORIDE 10 MG: 10 TABLET, FILM COATED ORAL at 10:12

## 2019-04-11 RX ADMIN — LEVOTHYROXINE SODIUM 50 MCG: 50 TABLET ORAL at 08:16

## 2019-04-11 RX ADMIN — Medication 10 ML: at 06:00

## 2019-04-11 RX ADMIN — DIVALPROEX SODIUM 125 MG: 125 CAPSULE, COATED PELLETS ORAL at 10:09

## 2019-04-11 RX ADMIN — PANTOPRAZOLE SODIUM 40 MG: 40 TABLET, DELAYED RELEASE ORAL at 08:16

## 2019-04-11 RX ADMIN — HEPARIN SODIUM 5000 UNITS: 5000 INJECTION INTRAVENOUS; SUBCUTANEOUS at 10:09

## 2019-04-11 RX ADMIN — HEPARIN SODIUM 5000 UNITS: 5000 INJECTION INTRAVENOUS; SUBCUTANEOUS at 01:37

## 2019-04-11 NOTE — CDMP QUERY
Adult Malnutrition Guidelines: 
MALNUTRITION OF MODERATE DEGREE - NON-SEVERE MALNUTRITION IN THE CONTEXT OF CHRONIC ILLNESS Weight Loss: 5% x 1 month, 7.5% x 3 months, 10% x 6 months, 20% x 12 months Energy Intake: <75% energy intake compared to estimated energy needs >1 month Pt admitted with SBO and is noted to have weight loss of 13% past 6 months and probable intake of < 75% estimated needs past month. Please document in progress notes and discharge summary if you are treating any of the following: · Moderate protein calorie malnutrition · Mild malnutrition · Other (please specify) · Unable to determine The medical record reflects the following: 
 
  Risk Factors: Cancer, bowel obstruction Clinical Indicators: Per RD consult: Pt meets ASPEN MALNUTRITION CRITERIA for Chronic Illness; moderate malnutrition r/t weight loss of 13% past 6 months and probable intake of < 75% estimated needs past month. Pt underweight with weight 136#, BMI 19.51. Treatment: Continue Full Liquid diet; advance per MD recommendation as medical condition dictates, Ensure Enlive on all meal trays. Gave patient an Ensure Enlive to sip on while waiting on supper meal tray. Thank you. Jesus Aponte, RN, BSN, CDS Clinical Documentation Management Program 
[facility] [address] 
[phone] [email or web address]

## 2019-04-11 NOTE — DISCHARGE SUMMARY
Discharge Summary     Patient ID:  Ellis Casarezf  [id number]   82 y.o.  1935    Admit date: 4/2/2019    Discharge Date: 4/12/2019      Admitting Physician: Alicia Anderson MD     Discharge Physician: Alicia Anderson MD    Admission Diagnoses: SBO (small bowel obstruction) ([facility] [Q55.965]    Last Procedure: Procedure(s):  LAPAROTOMY EXPLORATORY , LYSIS OF ADHESIONS AND LYSIS OF ADHESIVE BAND    Discharge Diagnoses: Principal Problem:    SBO (small bowel obstruction) ([facility] (4/2/2019)         Consults: None    Significant Diagnostic Studies: radiology: CT scan: showing a distal SBO     Hospital Course:   Normal hospital course for this procedure. Disposition: Nursing Home    Patient Instructions:   Current Discharge Medication List      CONTINUE these medications which have NOT CHANGED    Details   ferrous sulfate (SLOW FE) 142 mg (45 mg iron) ER tablet Take 45 mg by mouth daily. !! memantine-donepezil (NAMZARIC) 28-10 mg CSpX Take on q day  Qty: 90 Cap, Refills: 4      levothyroxine (SYNTHROID) 50 mcg tablet Take 1 Tab by mouth Daily (before breakfast). Qty: 90 Tab, Refills: 3    Associated Diagnoses: Acquired hypothyroidism      traZODone (DESYREL) 50 mg tablet Take 75 mg by mouth nightly. !! NAMZARIC 28-10 mg CSpX TAKE 1 TABLET BY MOUTH DAILY  Qty: 30 Cap, Refills: 4      divalproex (DEPAKOTE SPRINKLE) 125 mg capsule Take 125 mg by mouth two (2) times a day. Associated Diagnoses: Medication refill      melatonin 3 mg tablet as needed  Refills: 0      acetaminophen (TYLENOL) 325 mg tablet TAKE 2 TABLETS BY MOUTH EVERY 8 HOURS AS NEEDED  Refills: 0      docusate sodium (COLACE) 100 mg capsule TID prn  Refills: 0       !! - Potential duplicate medications found. Please discuss with provider. Diet: Reference my discharge instructions. Activity: Reference my discharge instructions. No orders of the defined types were placed in this encounter.        Total time discharging patient took greater than 30 minutes.     Signed:  Fabrice Trevino MD  April 11, 2019  12:57 PM

## 2019-04-11 NOTE — PROGRESS NOTES
Pt in bed resting. Resp even & unlabored; no acute signs of distress noted. Bed low & locked with alarm active & audible; call light in reach; no needs voiced.

## 2019-04-11 NOTE — PROGRESS NOTES
Care Management Interventions PCP Verified by CM: Yes Mode of Transport at Discharge: BLS Transition of Care Consult (CM Consult): SNF Partner SNF: No 
Reason Why Partner SNF Not Chosen: Location Physical Therapy Consult: Yes Current Support Network: Lives with Spouse Confirm Follow Up Transport: Family Plan discussed with Pt/Family/Caregiver: Yes Freedom of Choice Offered: Yes Discharge Location Discharge Placement: Skilled nursing facility Pt transferring to [address] [place] [address] today. Pt will go via ambulance transport. Spouse given address. [facility] transport set for 3:45 pm today. Solitario Davis

## 2019-04-11 NOTE — PROGRESS NOTES
Follow-up visit made by Gaviota Renee. Patient request for Mateo alvares and nidhi. Arnulfo Soto to see patient on 4/11/19. Francisca Pedro M.Div.

## 2019-04-11 NOTE — DISCHARGE INSTRUCTIONS
Discharge Instructions/Follow-up Plans:   MD Instructions:     Follow-up with Dr. Yves Jennings in 1 week. Keep incisions clean and dry, may remain uncovered. Do not apply lotions, creams or ointments to incisions.     Diet - as tolerated - Soft foods diet. Activity - ambulate - as tolerated - no heavy lifting >10lb. May shower - no tub baths or soaking/submerging.     No driving while taking narcotics. Do not drink alcohol while taking narcotics.   Resume other home medications.      If problems or questions arise, please call our office at [phone].     Greater than 30 minutes were spent discharging the patient

## 2019-04-11 NOTE — PROGRESS NOTES
Shift assessment completed via doc flow sheet. Pt A & O to person. Lungs CTA, HR WNL, NSR. Abdomen soft and  tender. No c/o pain at this time. IVS c/d/i, no s/sx of infection/infiltration noted. Pt able to make needs known. No concerns at this time. Bed low and locked. Bed alarm in place. sitter at bedside. Call light within reach. Will continue to monitor.

## 2019-04-11 NOTE — PROGRESS NOTES
Problem: Mobility Impaired (Adult and Pediatric) Goal: *Acute Goals and Plan of Care (Insert Text) Description STG: 
(1.)Mr. Dave will move from supine to sit and sit to supine  with STAND BY ASSIST within 4-7 treatment day(s). 4/11 
(2.)Mr. Dave will transfer from bed to chair and chair to bed with STAND BY ASSIST using the least restrictive device within 4-7 treatment day(s). (3.)Mr. Dave will ambulate with CONTACT GUARD ASSIST for 250 feet with the least restrictive device within 4-7 treatment day(s). ________________________________________________________________________________________________ Outcome: Progressing Towards Goal 
  
PHYSICAL THERAPY: Daily Note and AM 4/11/2019 INPATIENT: PT Visit Days : 3 [place] / [place] B / Product Type: Medicare /   
  
NAME/AGE/GENDER: Gallo Raphael is a 82 y.o. male PRIMARY DIAGNOSIS: SBO (small bowel obstruction) ([address] [K56.609] SBO (small bowel obstruction) ([address] SBO (small bowel obstruction) ([address] Procedure(s) (LRB): 
LAPAROTOMY EXPLORATORY , LYSIS OF ADHESIONS AND LYSIS OF ADHESIVE BAND (N/A) 6 Days Post-Op ICD-10: Treatment Diagnosis:  
 · Generalized Muscle Weakness (M62.81) · Difficulty in walking, Not elsewhere classified (R26.2) Precaution/Allergies: 
Shellfish containing products ASSESSMENT:  
Mr. Alex Armijo is supine with bed alarm on. He performs exercises in the bed with guidance. He increase his distance without AD just HHA. He return to supine with bed alarm on and call light near. This section established at most recent assessment PROBLEM LIST (Impairments causing functional limitations): 1. Decreased Strength 2. Decreased ADL/Functional Activities 3. Decreased Transfer Abilities 4. Decreased Ambulation Ability/Technique 5. Decreased Balance INTERVENTIONS PLANNED: (Benefits and precautions of physical therapy have been discussed with the patient.) 1. Balance Exercise 2. Bed Mobility 3. Gait Training 4. Therapeutic Activites 5. Therapeutic Exercise/Strengthening 6. Transfer Training TREATMENT PLAN: Frequency/Duration: daily for duration of hospital stay Rehabilitation Potential For Stated Goals: Good RECOMMENDED REHABILITATION/EQUIPMENT: (at time of discharge pending progress): Due to the probability of continued deficits (see above) this patient will likely need continued skilled physical therapy after discharge. Equipment: ? To be determined HISTORY:  
History of Present Injury/Illness (Reason for Referral): PER MD NOTE: Gallo Raphael is a 82 y.o. male who presents nausea and vomiting with crampy abdominal pain. CT showed distal SBO. Previous colectomy with post op SBO. Demented Wife present. Denies any pain. Past Medical History/Comorbidities:  
Mr. Alex Armijo  has a past medical history of Anemia, Colon cancer ([facility] Dementia, Full dentures, History of chemotherapy, History of prostate cancer, Hypothyroidism, and Vitamin D deficiency. Mr. Alex Armijo  has a past surgical history that includes hx hernia repair; hx colonoscopy; hx endoscopy; hx colectomy; and colonoscopy (N/A, 5/15/2017). Social History/Living Environment:  
Home Environment: Private residence One/Two Story Residence: One story Living Alone: No 
Support Systems: Family member(s), Spouse/Significant Other/Partner Patient Expects to be Discharged to[de-identified] Private residence Current DME Used/Available at Home: None Prior Level of Function/Work/Activity: 
Pt living at home with wife, requires assist for ADLs due to his dementia Number of Personal Factors/Comorbidities that affect the Plan of Care: 0: LOW COMPLEXITY EXAMINATION:  
Most Recent Physical Functioning:  
Gross Assessment: 
  
         
  
Posture: 
  
Balance: 
  Bed Mobility: 
Supine to Sit: Stand-by assistance Sit to Supine: Stand-by assistance Wheelchair Mobility: 
  
Transfers: Sit to Stand: Stand-by assistance Stand to Sit: Stand-by assistance Duration: 30 Minutes Gait: 
  
Base of Support: Center of gravity altered Speed/Alexandra: Shuffled Step Length: Left shortened;Right shortened Gait Abnormalities: Decreased step clearance Distance (ft): 260 Feet (ft) Assistive Device: (HHA) Ambulation - Level of Assistance: Minimal assistance Body Structures Involved: 1. Muscles Body Functions Affected: 1. Movement Related Activities and Participation Affected: 1. Mobility 2. Self Care Number of elements that affect the Plan of Care: 4+: HIGH COMPLEXITY CLINICAL PRESENTATION:  
Presentation: Stable and uncomplicated: LOW COMPLEXITY CLINICAL DECISION MAKING:  
[facility] AM-PAC 6 Clicks Basic Mobility Inpatient Short Form How much difficulty does the patient currently have. .. Unable A Lot A Little None 1. Turning over in bed (including adjusting bedclothes, sheets and blankets)? ? 1   ? 2   ? 3   ? 4  
2. Sitting down on and standing up from a chair with arms ( e.g., wheelchair, bedside commode, etc.)   ? 1   ? 2   ? 3   ? 4  
3. Moving from lying on back to sitting on the side of the bed?   ? 1   ? 2   ? 3   ? 4 How much help from another person does the patient currently need. .. Total A Lot A Little None 4. Moving to and from a bed to a chair (including a wheelchair)? ? 1   ? 2   ? 3   ? 4  
5. Need to walk in hospital room? ? 1   ? 2   ? 3   ? 4  
6. Climbing 3-5 steps with a railing? ? 1   ? 2   ? 3   ? 4  
© 2007, Trustees of [facility], under license to [employer]. All rights reserved Score:  Initial: 17 Most Recent: X (Date: -- ) Interpretation of Tool:  Represents activities that are increasingly more difficult (i.e. Bed mobility, Transfers, Gait). Medical Necessity:    
· Patient is expected to demonstrate progress in strength, coordination and functional technique ·  to decrease assistance required with functional mobility · . Reason for Services/Other Comments: 
· Patient continues to require skilled intervention due to inability to complete functional mobility independently · . Use of outcome tool(s) and clinical judgement create a POC that gives a: Clear prediction of patient's progress: LOW COMPLEXITY  
  
 
 
 
TREATMENT:  
(In addition to Assessment/Re-Assessment sessions the following treatments were rendered) Pre-treatment Symptoms/Complaints:  agreeable Pain: Initial:  
Pain Intensity 1: 0 No reports of pain Post Session:  no reports of pain Therapeutic Activity: (  30 Minutes ):  Therapeutic activities including Ambulation on level ground to improve mobility, strength and balance. Required minimal   to promote dynamic balance in standing. Date: 
4/11 Date: 
 Date: Activity/Exercise Parameters Parameters Parameters Ankle pumps 12    
heelslides 12 Hip abd/add 12 SLR 12 Braces/Orthotics/Lines/Etc:  
· O2 Device: Room air Treatment/Session Assessment:   
· Response to Treatment:  Tolerated gait and exercises well. · Interdisciplinary Collaboration:  
o Registered Nurse · After treatment position/precautions:  
o Supine in bed 
o Bed alarm/tab alert on 
o Bed/Chair-wheels locked 
o Bed in low position 
o Caregiver at bedside 
o Call light within reach · Compliance with Program/Exercises: Will assess as treatment progresses · Recommendations/Intent for next treatment session: \"Next visit will focus on advancements to more challenging activities and reduction in assistance provided\". Total Treatment Duration: PT Patient Time In/Time Out Time In: 0700 Time Out: 0730 Janna Lainez, PTA

## 2019-04-11 NOTE — PROGRESS NOTES
Problem: Falls - Risk of 
Goal: *Absence of Falls Description Document Camryn Mccormick Fall Risk and appropriate interventions in the flowsheet. Outcome: Progressing Towards Goal 
  
Problem: Pressure Injury - Risk of 
Goal: *Prevention of pressure injury Description Document Nilo Scale and appropriate interventions in the flowsheet. Outcome: Progressing Towards Goal 
  
Problem: Surgical Pathway: Discharge Outcomes Goal: *Hemodynamically stable Outcome: Progressing Towards Goal 
Goal: *Lungs clear or at baseline Outcome: Progressing Towards Goal 
Goal: *Optimal pain control at patient's stated goal 
Outcome: Progressing Towards Goal 
Goal: *Tolerating diet Outcome: Progressing Towards Goal 
Goal: *No signs and symptoms of infection or wound complications Outcome: Progressing Towards Goal 
Goal: *Anxiety reduced or absent Outcome: Progressing Towards Goal

## 2019-04-11 NOTE — PROGRESS NOTES
Pt taken in stable condition with belongings by [facility] ambulance to [facility].

## 2019-04-11 NOTE — PROGRESS NOTES
TRANSFER - OUT REPORT: 
 
Verbal report given to Chloé Owens (name) on [employer].  being transferred to [facility] rehab (unit) for routine progression of care Report consisted of patients Situation, Background, Assessment and  
Recommendations(SBAR). Information from the following report(s) SBAR, Kardex, Intake/Output, MAR and Recent Results was reviewed with the receiving nurse. Lines:    
 
Opportunity for questions and clarification was provided.

## 2019-04-12 ENCOUNTER — PATIENT OUTREACH (OUTPATIENT)
Dept: CASE MANAGEMENT | Age: 84
End: 2019-04-12

## 2019-04-12 NOTE — PROGRESS NOTES
This note will not be viewable in [address] Patient discharged to [facility] on 4/11/19. LINDA outreach postponed for 21 days due to discharge to non-preferred network SNF.

## 2019-04-16 ENCOUNTER — APPOINTMENT (OUTPATIENT)
Dept: CT IMAGING | Age: 84
End: 2019-04-16
Attending: EMERGENCY MEDICINE
Payer: MEDICARE

## 2019-04-16 ENCOUNTER — HOSPITAL ENCOUNTER (EMERGENCY)
Age: 84
Discharge: HOME | End: 2019-04-16
Attending: EMERGENCY MEDICINE | Admitting: EMERGENCY MEDICINE
Payer: MEDICARE

## 2019-04-16 VITALS
RESPIRATION RATE: 16 BRPM | TEMPERATURE: 98.8 F | SYSTOLIC BLOOD PRESSURE: 159 MMHG | DIASTOLIC BLOOD PRESSURE: 75 MMHG | HEART RATE: 82 BPM | OXYGEN SATURATION: 97 %

## 2019-04-16 DIAGNOSIS — Z87.898 HISTORY OF HEADACHE: Primary | ICD-10-CM

## 2019-04-16 PROCEDURE — 70450 CT HEAD/BRAIN W/O DYE: CPT

## 2019-04-16 PROCEDURE — 99284 EMERGENCY DEPT VISIT MOD MDM: CPT | Performed by: EMERGENCY MEDICINE

## 2019-04-16 NOTE — ED TRIAGE NOTES
Per EMS, wife reports that at approx 3pm, pt c/o explosive headache. No LOC or neuro deficit. Pt is AAO X3 and walked to the bed from the EMS stretcher.

## 2019-04-17 NOTE — DISCHARGE INSTRUCTIONS
Tylenol 500 mg every 6 hours if needed for headache. Follow up with his primary care doctor later this week or early next week for follow-up and recheck. Return if any new, worsening or concerning symptoms.

## 2019-04-17 NOTE — ED NOTES
I have reviewed discharge instructions with the patient and caregiver. The patient and caregiver verbalized understanding. Patient left ED via Discharge Method: wheelchair to Home with family. Opportunity for questions and clarification provided. Patient given 0 scripts. To continue your aftercare when you leave the hospital, you may receive an automated call from our care team to check in on how you are doing. This is a free service and part of our promise to provide the best care and service to meet your aftercare needs.  If you have questions, or wish to unsubscribe from this service please call [phone]. Thank you for Choosing our [facility].

## 2019-04-17 NOTE — ED PROVIDER NOTES
Patient's wife was concerned that the patient had a severe headache. He grabbed his head and rolled into a fetal position for about 5 minutes. Patient currently in no acute distress and denies any pain and denies headache specifically. He does have history of dementia. The history is provided by the spouse. Headache This is a new problem. The current episode started 3 to 5 hours ago. The problem has been resolved. The headache is aggravated by nothing. He has tried nothing for the symptoms. Fatigue Associated symptoms include headaches. Past Medical History:  
Diagnosis Date  Anemia   
 hx of- prior to cancer dx  Colon cancer ([facility] [address]  Dementia  Full dentures  History of chemotherapy PORT-A-CATH FOR CHEMO  
 History of prostate cancer  Hypothyroidism   
 managed well with Synthroid  Vitamin D deficiency Past Surgical History:  
Procedure Laterality Date  COLONOSCOPY N/A 5/15/2017 COLONOSCOPY performed by Bill Bateman MD at [address]  HX COLONOSCOPY    
 HX ENDOSCOPY    
 HX HERNIA REPAIR    
 groin - unknown side Family History:  
Problem Relation Age of Onset  Diabetes Mother  Heart Disease Mother  Heart Disease Father  Hypertension Father Social History Socioeconomic History  Marital status:  Spouse name: Not on file  Number of children: Not on file  Years of education: Not on file  Highest education level: Not on file Occupational History  Not on file Social Needs  Financial resource strain: Not on file  Food insecurity:  
  Worry: Not on file Inability: Not on file  Transportation needs:  
  Medical: Not on file Non-medical: Not on file Tobacco Use  Smoking status: Never Smoker  Smokeless tobacco: Never Used  Tobacco comment: Smoked about 1 year when he was 27. Substance and Sexual Activity  Alcohol use:  No  
 Alcohol/week: 0.0 oz  Drug use: No  
 Sexual activity: Not on file Lifestyle  Physical activity:  
  Days per week: Not on file Minutes per session: Not on file  Stress: Not on file Relationships  Social connections:  
  Talks on phone: Not on file Gets together: Not on file Attends Restorationist service: Not on file Active member of club or organization: Not on file Attends meetings of clubs or organizations: Not on file Relationship status: Not on file  Intimate partner violence:  
  Fear of current or ex partner: Not on file Emotionally abused: Not on file Physically abused: Not on file Forced sexual activity: Not on file Other Topics Concern  Not on file Social History Narrative  Not on file ALLERGIES: Shellfish containing products Review of Systems Unable to perform ROS: Dementia Constitutional: Positive for fatigue. Neurological: Positive for headaches. Vitals:  
 04/16/19 1941 BP: 159/75 Pulse: 79 Resp: 16 Temp: 98.8 °F (37.1 °C) SpO2: 98% Physical Exam  
Constitutional: He appears well-developed and well-nourished. No distress. Smiling talkative and interactive but confused HENT:  
Mouth/Throat: Oropharynx is clear and moist.  
Eyes: Pupils are equal, round, and reactive to light. Conjunctivae and EOM are normal.  
Neck: Neck supple. Full range of motion without pain or discomfort Cardiovascular: Normal rate, regular rhythm and normal heart sounds. Pulmonary/Chest: Effort normal and breath sounds normal.  
Abdominal: Soft. He exhibits no distension. There is tenderness (mild and appropriate for postsurgicalstatus. ). Staples still intact from recent bowel obstruction surgery Musculoskeletal: Normal range of motion. He exhibits no edema. Lymphadenopathy:  
  He has no cervical adenopathy. Neurological: He is alert. No cranial nerve deficit or sensory deficit.  He exhibits normal muscle tone. Coordination normal. GCS eye subscore is 4. GCS verbal subscore is 4. GCS motor subscore is 6. Nursing note and vitals reviewed. MDM Number of Diagnoses or Management Options Diagnosis management comments: CT scan negative and headache onset within 6 hours. LP will not be needed. Patient is in no distress and denies headache. No temporal artery tenderness. No current headache. Discharge home. Amount and/or Complexity of Data Reviewed Tests in the radiology section of CPT®: ordered and reviewed (Ct Head Wo Cont Result Date: 4/16/2019 Indication:82year-old male with sudden headache Comparison exams: None Findings: Axial imaging from skull base to vertex without intravenous contrast. Bony calvarium is intact. Paranasal sinuses are clear. Mild degree of low-attenuation within periventricular white matter consistent appearance of chronic small vessel changes. This may limit evaluation for any subtle ischemia. No large areas of ischemia identified. Generalized central and cortical atrophy with resultant prominence of ventricles. No midline shift. No large acute territorial infarct or evidence of intracranial hemorrhage seen. Impression:  No acute intracranial process. Chronic changes as described above ) Procedures

## 2019-04-18 PROBLEM — Z98.890 S/P EXPLORATORY LAPAROTOMY: Status: ACTIVE | Noted: 2019-04-18

## 2019-05-03 ENCOUNTER — PATIENT OUTREACH (OUTPATIENT)
Dept: CASE MANAGEMENT | Age: 84
End: 2019-05-03

## 2019-05-03 NOTE — Clinical Note
Has readmitted to the ED with in 2 weeks of hospital dc. Wife was extremely confused about [facility] services, stated patient was supposed to have an aide from the [place] but they left and haven't been back. Has SN/SW w/ [facility] for 2 weeks.

## 2019-05-03 NOTE — PROGRESS NOTES
This note will not be viewable in [address] Transition of Care Discharge Follow-up Questionnaire Date/Time of Call: 
 5/3/19 
 218pm  
What was the patient hospitalized for? SBO 
d/c [facility] 4/11/19 
d/c home Does the patient understand his/her diagnosis and/or treatment and what happened during the hospitalization? Yes, spoke with patients wife Janey Saravia, she states understanding of diagnosis and treatment; and is agreeable to call. Janey Saravia seemed confused regarding aftercare and services. Did the patient receive discharge instructions? Yes   
CM Assessed Risk for Readmission:  
 
 
Patient stated Risk for Readmission: Low/moderate  r/t diagnosis and/or comorbidities None stated Review any discharge instructions (see discharge instructions/AVS in [facility]). Ask patient if they understand these. Do they have any questions? Reviewed, understanding is stated, no questions at this time Were home services ordered (nursing, PT, OT, ST, etc.)? Noted per [facility]  [facility], verified with Kai Brantley at [facility] [place] patient is receiving SN & SW for 2 weeks Janey Saravia stated was supposed to have aide from the [place] 2hr/day 4day/week, someone was here Wednesday and left and hasnt been back If so, has the first visit occurred? If not, why? (Assist with coordination of services if necessary.) 
 N/A Was any DME ordered? No 
  
If so, has it been received? If not, why?  (Assist patient in obtaining DME orders &/or equipment if necessary.) N/A Complete a review of all medications (new, continued and discontinued meds per the D/C instructions and medication tab in [facility]). Attempted Were all new prescriptions filled? If not, why?  (Assist patient in obtaining medications if necessary  escalate for CCM &/or SW if ongoing issues are verbalized by pt or anticipated) Janey Saravia states I have all his medicine here Does the patient understand the purpose and dosing instructions for all medications? (If patient has questions, provide explanation and education.) Yes Does the patient have any problems in performing ADLs? (If patient is unable to perform ADLs  what is the limiting factor(s)? Do they have a support system that can assist? If no support system is present, discuss possible assistance that they may be able to obtain. Escalate for CCM/SW if ongoing issues are verbalized by pt or anticipated) Full assist with ADLs Does the patient have all follow-up appointments scheduled? 7 day f/up with PCP?  
(f/up with PCP may be w/in 14 days if patient has a f/up with their specialist w/in 7 days) 7-14 day f/up with specialist?  
(or per discharge instructions) If f/up has not been made  what actions has the care coordinator made to accomplish this? Has transportation been arranged? Yes Dr. Teodoro Hernandez 5/3/19, next visit 6/3/19 Dr. Lakshmi Solis general surgery 4/18/19, next visit as needed Yes, no transportation needs identified at this time. Any other questions or concerns expressed by the patient? No other needs or concerns identified. Alanson Apley states her gratitude for follow up. Contact information for [facility] was given, instructed to call with new questions or concerns. Schedule next appointment with IAN ARGUELLO Coordinator or refer to RN Case Manager/ per the workflow guidelines. When is care coordinators next follow-up call scheduled? If referred for CCM  what RN care manager was the referral assigned? Discussed referral to RN LNI due to readmission to ED in less than 30 days and confusion regarding HH/aide services. Alanson Apley was receptive and agreeable. Information sent to Aaron Charles RN CCM  
 
 
  
[facility] Call Completed By: Familia Truong LPN Community Care Coordinator

## 2019-05-06 ENCOUNTER — HOSPITAL ENCOUNTER (OUTPATIENT)
Dept: CT IMAGING | Age: 84
Discharge: HOME | End: 2019-05-06
Attending: INTERNAL MEDICINE
Payer: MEDICARE

## 2019-05-06 DIAGNOSIS — C18.4 MALIGNANT NEOPLASM OF TRANSVERSE COLON (HCC): Chronic | ICD-10-CM

## 2019-05-06 PROCEDURE — 74177 CT ABD & PELVIS W/CONTRAST: CPT

## 2019-05-06 PROCEDURE — 74011000258 HC RX REV CODE- 258: Performed by: INTERNAL MEDICINE

## 2019-05-06 PROCEDURE — 74011636320 HC RX REV CODE- 636/320: Performed by: INTERNAL MEDICINE

## 2019-05-06 RX ORDER — SODIUM CHLORIDE 0.9 % (FLUSH) 0.9 %
10 SYRINGE (ML) INJECTION
Status: COMPLETED | OUTPATIENT
Start: 2019-05-06 | End: 2019-05-06

## 2019-05-06 RX ADMIN — SODIUM CHLORIDE 100 ML: 900 INJECTION, SOLUTION INTRAVENOUS at 16:45

## 2019-05-06 RX ADMIN — DIATRIZOATE MEGLUMINE AND DIATRIZOATE SODIUM 15 ML: 660; 100 LIQUID ORAL; RECTAL at 16:44

## 2019-05-06 RX ADMIN — Medication 10 ML: at 16:45

## 2019-05-06 RX ADMIN — IOPAMIDOL 100 ML: 755 INJECTION, SOLUTION INTRAVENOUS at 16:45

## 2019-05-07 ENCOUNTER — HOSPITAL ENCOUNTER (OUTPATIENT)
Dept: LAB | Age: 84
Discharge: HOME | End: 2019-05-07
Payer: MEDICARE

## 2019-05-07 DIAGNOSIS — C18.4 MALIGNANT NEOPLASM OF TRANSVERSE COLON (HCC): ICD-10-CM

## 2019-05-07 LAB
ALBUMIN SERPL-MCNC: 3.4 G/DL (ref 3.2–4.6)
ALBUMIN/GLOB SERPL: 0.9 {RATIO} (ref 1.2–3.5)
ALP SERPL-CCNC: 65 U/L (ref 50–136)
ALT SERPL-CCNC: 29 U/L (ref 12–65)
ANION GAP SERPL CALC-SCNC: 3 MMOL/L (ref 7–16)
AST SERPL-CCNC: 21 U/L (ref 15–37)
BASOPHILS # BLD: 0 K/UL (ref 0–0.2)
BASOPHILS NFR BLD: 0 % (ref 0–2)
BILIRUB SERPL-MCNC: 0.4 MG/DL (ref 0.2–1.1)
BUN SERPL-MCNC: 11 MG/DL (ref 8–23)
CALCIUM SERPL-MCNC: 8.4 MG/DL (ref 8.3–10.4)
CEA SERPL-MCNC: 1.9 NG/ML (ref 0–3)
CHLORIDE SERPL-SCNC: 105 MMOL/L (ref 98–107)
CO2 SERPL-SCNC: 31 MMOL/L (ref 21–32)
CREAT SERPL-MCNC: 1.16 MG/DL (ref 0.8–1.5)
DIFFERENTIAL METHOD BLD: ABNORMAL
EOSINOPHIL # BLD: 0.1 K/UL (ref 0–0.8)
EOSINOPHIL NFR BLD: 2 % (ref 0.5–7.8)
ERYTHROCYTE [DISTWIDTH] IN BLOOD BY AUTOMATED COUNT: 15.1 % (ref 11.9–14.6)
GLOBULIN SER CALC-MCNC: 3.9 G/DL (ref 2.3–3.5)
GLUCOSE SERPL-MCNC: 80 MG/DL (ref 65–100)
HCT VFR BLD AUTO: 38 % (ref 41.1–50.3)
HGB BLD-MCNC: 12.2 G/DL (ref 13.6–17.2)
IMM GRANULOCYTES # BLD AUTO: 0 K/UL (ref 0–0.5)
IMM GRANULOCYTES NFR BLD AUTO: 0 % (ref 0–5)
LYMPHOCYTES # BLD: 1 K/UL (ref 0.5–4.6)
LYMPHOCYTES NFR BLD: 35 % (ref 13–44)
MCH RBC QN AUTO: 29.5 PG (ref 26.1–32.9)
MCHC RBC AUTO-ENTMCNC: 32.1 G/DL (ref 31.4–35)
MCV RBC AUTO: 91.8 FL (ref 79.6–97.8)
MONOCYTES # BLD: 0.4 K/UL (ref 0.1–1.3)
MONOCYTES NFR BLD: 13 % (ref 4–12)
NEUTS SEG # BLD: 1.4 K/UL (ref 1.7–8.2)
NEUTS SEG NFR BLD: 50 % (ref 43–78)
NRBC # BLD: 0 K/UL (ref 0–0.2)
PLATELET # BLD AUTO: 187 K/UL (ref 150–450)
PMV BLD AUTO: 9.6 FL (ref 9.4–12.3)
POTASSIUM SERPL-SCNC: 4.1 MMOL/L (ref 3.5–5.1)
PROT SERPL-MCNC: 7.3 G/DL (ref 6.3–8.2)
RBC # BLD AUTO: 4.14 M/UL (ref 4.23–5.67)
SODIUM SERPL-SCNC: 139 MMOL/L (ref 136–145)
WBC # BLD AUTO: 2.8 K/UL (ref 4.3–11.1)

## 2019-05-07 PROCEDURE — 36415 COLL VENOUS BLD VENIPUNCTURE: CPT

## 2019-05-07 PROCEDURE — 80053 COMPREHEN METABOLIC PANEL: CPT

## 2019-05-07 PROCEDURE — 85025 COMPLETE CBC W/AUTO DIFF WBC: CPT

## 2019-05-07 PROCEDURE — 82378 CARCINOEMBRYONIC ANTIGEN: CPT

## 2021-09-15 NOTE — ED PROVIDER NOTES
HPI Comments: 82-year-old male presents with wife for concerns about his port. He had a port placed at least 2 years ago for chemotherapy from colon Cancer. He has not received chemotherapy \"for a while. \"   [employer] [place] had been used a month ago for blood draw at his doctor's office. A dressing was placed, but wife never knew to remove it. Patient had complained that he feels wet around the port, so she brought him to the ED. There has been no redness or drainage. No fevers or chills. Patient is a [de-identified] y.o. male presenting with vascular access problem. The history is provided by the patient. Vascular Access Problem           Past Medical History:   Diagnosis Date    Anemia     Cancer [facility])     prostate cancer.  Dementia     Full dentures     Thyroid disease     hypothyroid. Past Surgical History:   Procedure Laterality Date    COLONOSCOPY N/A 5/15/2017    COLONOSCOPY performed by Gildardo Ruelas [facility] at [facility] ENDOSCOPY    HX COLECTOMY      HX COLONOSCOPY      HX ENDOSCOPY      HX HERNIA REPAIR      groin - unknown side          Family History:   Problem Relation Age of Onset    Diabetes Mother     Heart Disease Mother     Heart Disease Father     Hypertension Father        Social History     Social History    Marital status:      Spouse name: N/A    Number of children: N/A    Years of education: N/A     Occupational History    Not on file. Social History Main Topics    Smoking status: Never Smoker    Smokeless tobacco: Never Used      Comment: Smoked about 1 year when he was 27.  Alcohol use No    Drug use: No    Sexual activity: Not on file     Other Topics Concern    Not on file     Social History Narrative         ALLERGIES: Shellfish containing products    Review of Systems   Constitutional: Negative for chills and fever. Respiratory: Negative for cough and shortness of breath. Skin: Negative for wound. Psychiatric/Behavioral: Positive for confusion. Vitals:    08/13/17 1915   BP: 177/71   Pulse: (!) 56   Resp: 24   SpO2: 100%   Weight: 73 kg (161 lb)   Height: 5' 10\" (1.778 m)            Physical Exam   Constitutional: He appears well-nourished. HENT:   Head: Normocephalic and atraumatic. Eyes: Conjunctivae are normal. Pupils are equal, round, and reactive to light. Neck: Neck supple. Pulmonary/Chest: Effort normal. No respiratory distress. He exhibits no crepitus. Neurological: He is alert. Skin: Skin is dry. No erythema. Psychiatric: He has a normal mood and affect. Nursing note and vitals reviewed. MDM  Number of Diagnoses or Management Options  Well adult health check:   Diagnosis management comments: Parts of this document were created using dragon voice recognition software. The chart has been reviewed but errors may still be present. Patient and wife had failed to remove the dressing that was applied after blood draw one month ago. Advised if any further dressings are placed, these are safe to remove after 1 day unless further advised by the person placing the dressing. I discussed the results of all labs, procedures, radiographs, and treatments with the patient and available family. Treatment plan is agreed upon and the patient is ready for discharge. Questions about treatment in the ED and differential diagnosis of presenting condition were answered. Patient was given verbal discharge instructions including, but not limited to, importance of returning to the emergency department for any concern of worsening or continued symptoms. Instructions were given to follow up with a primary care provider or specialist within 1-2 days. Adverse effects of medications, if prescribed, were discussed and patient was advised to refrain from significant physical activity until followed up by primary care physician and to not drive or operate heavy machinery after taking any sedating substances.         ED Course Procedures Patient refused

## 2023-01-15 NOTE — BRIEF OP NOTE
levETIRAcetam (KEPPRA) 1000 MG tablet  Last Written Prescription Date: 1/14/22  Last Fill Quantity: 135,   # refills: 3  Last Office Visit : 12/26/22  Future Office visit:  2/13/23    Creatinine   Date Value Ref Range Status   04/22/2022 1.08 (H) 0.52 - 1.04 mg/dL Final       Routing refill request to provider for review/approval because:  jose LLOYD   BRIEF OPERATIVE NOTE Date of Procedure: 4/5/2019 Preoperative Diagnosis: OBSTRUCTION Postoperative Diagnosis: OBSTRUCTION Procedure(s): LAPAROTOMY EXPLORATORY , LYSIS OF ADHESIONS AND LYSIS OF ADHESIVE BAND Surgeon(s) and Role: Donavon Kussmaul, MD - Primary Surgical Assistant: none Surgical Staff: 
Circ-1: Molina Hurtado RN 
Circ-Relief: Vanessa Richter RN Scrub Tech-1: Mercer [place]; Yuly Netchepe Scrub Tech-2: Araseli Sitter Event Time In Time Out Incision Start 04/05/2019 1251 Incision Close 04/05/2019 1357 Anesthesia: General  
Estimated Blood Loss: minimal 
Specimens: * No specimens in log * Findings: see op note Complications: none Implants: * No implants in log *

## (undated) DEVICE — SUTURE PERMAHAND SZ 3-0 L18IN NONABSORBABLE BLK L26MM SH C013D

## (undated) DEVICE — BLADE ELECTRODE: Brand: VALLEYLAB

## (undated) DEVICE — REM POLYHESIVE ADULT PATIENT RETURN ELECTRODE: Brand: VALLEYLAB

## (undated) DEVICE — SURGICAL PROCEDURE PACK BASIC ST FRANCIS

## (undated) DEVICE — STAPLER EXT SKIN 35 WIDE S STL STPL SQUEEZE HNDL VISISTAT

## (undated) DEVICE — CANNULA NSL ORAL AD FOR CAPNOFLEX CO2 O2 AIRLFE

## (undated) DEVICE — TRAY CATH 16F DRN BG LTX -- CONVERT TO ITEM 363158

## (undated) DEVICE — 3M™ TEGADERM™ TRANSPARENT FILM DRESSING FRAME STYLE, 1627, 4 IN X 10 IN (10 CM X 25 CM), 20/CT 4CT/CASE: Brand: 3M™ TEGADERM™

## (undated) DEVICE — STERILE POLYISOPRENE POWDER-FREE SURGICAL GLOVES: Brand: PROTEXIS

## (undated) DEVICE — SUTURE PERMAHAND SZ 2-0 L12X18IN NONABSORBABLE BLK SILK A185H

## (undated) DEVICE — SOLUTION IV 1000ML 0.9% SOD CHL

## (undated) DEVICE — KENDALL SCD EXPRESS SLEEVES, KNEE LENGTH, MEDIUM: Brand: KENDALL SCD

## (undated) DEVICE — GAUZE SPONGES,12 PLY: Brand: CURITY

## (undated) DEVICE — SINGLE BASIN: Brand: CARDINAL HEALTH

## (undated) DEVICE — NDL PRT INJ NSAF BLNT 18GX1.5 --

## (undated) DEVICE — SYR 3ML LL TIP 1/10ML GRAD --

## (undated) DEVICE — SHEET, T, LAPAROTOMY, STERILE: Brand: MEDLINE

## (undated) DEVICE — KENDALL RADIOLUCENT FOAM MONITORING ELECTRODE RECTANGULAR SHAPE: Brand: KENDALL

## (undated) DEVICE — TRAY CENTRAL LINE BIOPATCH --

## (undated) DEVICE — DRAPE TWL SURG 16X26IN BLU ORB04] ALLCARE INC]

## (undated) DEVICE — CONTAINER SPEC HISTOLOGY 900ML POLYPR

## (undated) DEVICE — SUTURE PDS II SZ 1 L96IN ABSRB VLT TP-1 L65MM 1/2 CIR Z880G

## (undated) DEVICE — 2000CC GUARDIAN II: Brand: GUARDIAN

## (undated) DEVICE — SPONGE LAP 18X18IN STRL -- 5/PK

## (undated) DEVICE — SYR 5ML 1/5 GRAD LL NSAF LF --

## (undated) DEVICE — SUTURE PERMAHAND SZ 3-0 L18IN NONABSORBABLE BLK SILK BRAID A184H

## (undated) DEVICE — CONNECTOR TBNG OD5-7MM O2 END DISP

## (undated) DEVICE — PREP CHLORAPREP 10.5 ML ORG --

## (undated) DEVICE — CONTAINER SPEC FRMLN 120ML --

## (undated) DEVICE — AMD ANTIMICROBIAL NON-ADHERENT PAD,0.2% POLYHEXAMETHYLENE BIGUANIDE HCI (PHMB): Brand: TELFA

## (undated) DEVICE — SET INFUS 20GA L0.75IN 300PSI 5ML/SEC W/O Y INJ SITE ULT LO